# Patient Record
Sex: MALE | Race: WHITE | URBAN - METROPOLITAN AREA
[De-identification: names, ages, dates, MRNs, and addresses within clinical notes are randomized per-mention and may not be internally consistent; named-entity substitution may affect disease eponyms.]

---

## 2017-02-08 ENCOUNTER — MYC MEDICAL ADVICE (OUTPATIENT)
Dept: FAMILY MEDICINE | Facility: CLINIC | Age: 47
End: 2017-02-08

## 2017-02-08 DIAGNOSIS — J01.01 ACUTE RECURRENT MAXILLARY SINUSITIS: Primary | ICD-10-CM

## 2017-02-08 RX ORDER — AZITHROMYCIN 250 MG/1
TABLET, FILM COATED ORAL
Qty: 6 TABLET | Refills: 0 | Status: SHIPPED | OUTPATIENT
Start: 2017-02-08 | End: 2018-01-11

## 2017-03-09 DIAGNOSIS — E03.8 SUBCLINICAL HYPOTHYROIDISM: ICD-10-CM

## 2017-03-09 LAB — TSH SERPL DL<=0.05 MIU/L-ACNC: 2.02 MU/L (ref 0.4–4)

## 2017-03-09 PROCEDURE — 84443 ASSAY THYROID STIM HORMONE: CPT | Performed by: FAMILY MEDICINE

## 2017-03-09 PROCEDURE — 36415 COLL VENOUS BLD VENIPUNCTURE: CPT | Performed by: FAMILY MEDICINE

## 2017-10-12 DIAGNOSIS — F41.0 PANIC DISORDER: ICD-10-CM

## 2017-10-12 RX ORDER — SERTRALINE HYDROCHLORIDE 100 MG/1
100 TABLET, FILM COATED ORAL DAILY
Qty: 30 TABLET | Refills: 0 | Status: SHIPPED | OUTPATIENT
Start: 2017-10-12 | End: 2017-12-29

## 2017-10-12 NOTE — TELEPHONE ENCOUNTER
Sertraline     Last Written Prescription Date: 12/19/16  Last Fill Quantity: 90, # refills: 3  [place] [facility] [place]  12/02/16        Last PHQ-9 score on record=   PHQ-9 SCORE 7/26/2016   Total Score -   Total Score MyChart 2 (Minimal depression)   Total Score -

## 2017-12-29 DIAGNOSIS — F41.0 PANIC DISORDER: ICD-10-CM

## 2017-12-29 NOTE — TELEPHONE ENCOUNTER
Sertraline 100mg     Last Written Prescription Date: 10/12/2017  #30 x 0  Last filled - not provided, mail order pharmacy, [facility]  Last Office Visit with [facility] [place] care provider:  12/02/2016 CASTILLO Melton       Last PHQ-9 score on record=   PHQ-9 SCORE 7/26/2016   Total Score -   Total Score MyChart 2 (Minimal depression)   Total Score -

## 2018-01-02 NOTE — TELEPHONE ENCOUNTER
I have attempted to contact this patient by phone with the following results: left message to return my call on answering machine.  Needs a follow-up appointment for this request.    Rea Nickerson RN

## 2018-01-03 ENCOUNTER — MYC MEDICAL ADVICE (OUTPATIENT)
Dept: FAMILY MEDICINE | Facility: CLINIC | Age: 48
End: 2018-01-03

## 2018-01-04 ENCOUNTER — MYC MEDICAL ADVICE (OUTPATIENT)
Dept: FAMILY MEDICINE | Facility: CLINIC | Age: 48
End: 2018-01-04

## 2018-01-04 RX ORDER — SERTRALINE HYDROCHLORIDE 100 MG/1
100 TABLET, FILM COATED ORAL DAILY
Qty: 30 TABLET | Refills: 0 | Status: SHIPPED | OUTPATIENT
Start: 2018-01-04 | End: 2018-01-11

## 2018-01-04 ASSESSMENT — ANXIETY QUESTIONNAIRES
7. FEELING AFRAID AS IF SOMETHING AWFUL MIGHT HAPPEN: NOT AT ALL
GAD7 TOTAL SCORE: 0
4. TROUBLE RELAXING: NOT AT ALL
3. WORRYING TOO MUCH ABOUT DIFFERENT THINGS: NOT AT ALL
1. FEELING NERVOUS, ANXIOUS, OR ON EDGE: NOT AT ALL
7. FEELING AFRAID AS IF SOMETHING AWFUL MIGHT HAPPEN: NOT AT ALL
5. BEING SO RESTLESS THAT IT IS HARD TO SIT STILL: NOT AT ALL
GAD7 TOTAL SCORE: 0
6. BECOMING EASILY ANNOYED OR IRRITABLE: NOT AT ALL
2. NOT BEING ABLE TO STOP OR CONTROL WORRYING: NOT AT ALL

## 2018-01-04 NOTE — TELEPHONE ENCOUNTER
Routing refill request to provider for review/approval because:  Patient takes for panic attacks. I did send him A ARIS and a note letting him know he is overdue for an appointment. Marta Alcantara RN

## 2018-01-05 ASSESSMENT — ANXIETY QUESTIONNAIRES: GAD7 TOTAL SCORE: 0

## 2018-01-10 DIAGNOSIS — E03.8 SUBCLINICAL HYPOTHYROIDISM: ICD-10-CM

## 2018-01-10 DIAGNOSIS — F41.0 PANIC DISORDER: ICD-10-CM

## 2018-01-11 ENCOUNTER — MYC MEDICAL ADVICE (OUTPATIENT)
Dept: FAMILY MEDICINE | Facility: CLINIC | Age: 48
End: 2018-01-11

## 2018-01-11 ENCOUNTER — OFFICE VISIT (OUTPATIENT)
Dept: FAMILY MEDICINE | Facility: CLINIC | Age: 48
End: 2018-01-11
Payer: COMMERCIAL

## 2018-01-11 VITALS
TEMPERATURE: 98.2 F | OXYGEN SATURATION: 95 % | HEART RATE: 78 BPM | SYSTOLIC BLOOD PRESSURE: 136 MMHG | BODY MASS INDEX: 33.88 KG/M2 | DIASTOLIC BLOOD PRESSURE: 88 MMHG | RESPIRATION RATE: 16 BRPM | HEIGHT: 74 IN | WEIGHT: 264 LBS

## 2018-01-11 DIAGNOSIS — E03.8 SUBCLINICAL HYPOTHYROIDISM: ICD-10-CM

## 2018-01-11 DIAGNOSIS — J45.20 MILD INTERMITTENT ASTHMA WITHOUT COMPLICATION: ICD-10-CM

## 2018-01-11 DIAGNOSIS — E66.811 CLASS 1 OBESITY WITHOUT SERIOUS COMORBIDITY WITH BODY MASS INDEX (BMI) OF 33.0 TO 33.9 IN ADULT, UNSPECIFIED OBESITY TYPE: ICD-10-CM

## 2018-01-11 DIAGNOSIS — I10 ESSENTIAL HYPERTENSION: ICD-10-CM

## 2018-01-11 DIAGNOSIS — Z13.6 CARDIOVASCULAR SCREENING; LDL GOAL LESS THAN 130: Primary | ICD-10-CM

## 2018-01-11 DIAGNOSIS — R73.03 PREDIABETES: ICD-10-CM

## 2018-01-11 DIAGNOSIS — Z00.01 ENCOUNTER FOR ROUTINE ADULT MEDICAL EXAM WITH ABNORMAL FINDINGS: ICD-10-CM

## 2018-01-11 DIAGNOSIS — E03.4 HYPOTHYROIDISM DUE TO ACQUIRED ATROPHY OF THYROID: ICD-10-CM

## 2018-01-11 DIAGNOSIS — J31.0 OTHER CHRONIC RHINITIS: ICD-10-CM

## 2018-01-11 DIAGNOSIS — F41.0 PANIC DISORDER: ICD-10-CM

## 2018-01-11 DIAGNOSIS — R06.83 SNORING: ICD-10-CM

## 2018-01-11 LAB
ANION GAP SERPL CALCULATED.3IONS-SCNC: 6 MMOL/L (ref 3–14)
BUN SERPL-MCNC: 17 MG/DL (ref 7–30)
CALCIUM SERPL-MCNC: 8.7 MG/DL (ref 8.5–10.1)
CHLORIDE SERPL-SCNC: 106 MMOL/L (ref 94–109)
CHOLEST SERPL-MCNC: 195 MG/DL
CO2 SERPL-SCNC: 27 MMOL/L (ref 20–32)
CREAT SERPL-MCNC: 1.03 MG/DL (ref 0.66–1.25)
GFR SERPL CREATININE-BSD FRML MDRD: 77 ML/MIN/1.7M2
GLUCOSE SERPL-MCNC: 103 MG/DL (ref 70–99)
HBA1C MFR BLD: 5.8 % (ref 4.3–6)
HDLC SERPL-MCNC: 41 MG/DL
LDLC SERPL CALC-MCNC: 124 MG/DL
NONHDLC SERPL-MCNC: 154 MG/DL
POTASSIUM SERPL-SCNC: 4.2 MMOL/L (ref 3.4–5.3)
SODIUM SERPL-SCNC: 139 MMOL/L (ref 133–144)
TRIGL SERPL-MCNC: 148 MG/DL
TSH SERPL DL<=0.005 MIU/L-ACNC: 3.12 MU/L (ref 0.4–4)

## 2018-01-11 PROCEDURE — 80048 BASIC METABOLIC PNL TOTAL CA: CPT | Performed by: PHYSICIAN ASSISTANT

## 2018-01-11 PROCEDURE — 83036 HEMOGLOBIN GLYCOSYLATED A1C: CPT | Performed by: PHYSICIAN ASSISTANT

## 2018-01-11 PROCEDURE — 99212 OFFICE O/P EST SF 10 MIN: CPT | Mod: 25 | Performed by: PHYSICIAN ASSISTANT

## 2018-01-11 PROCEDURE — 36415 COLL VENOUS BLD VENIPUNCTURE: CPT | Performed by: PHYSICIAN ASSISTANT

## 2018-01-11 PROCEDURE — 93000 ELECTROCARDIOGRAM COMPLETE: CPT | Performed by: PHYSICIAN ASSISTANT

## 2018-01-11 PROCEDURE — 80061 LIPID PANEL: CPT | Performed by: PHYSICIAN ASSISTANT

## 2018-01-11 PROCEDURE — 84443 ASSAY THYROID STIM HORMONE: CPT | Performed by: PHYSICIAN ASSISTANT

## 2018-01-11 PROCEDURE — 99396 PREV VISIT EST AGE 40-64: CPT | Performed by: PHYSICIAN ASSISTANT

## 2018-01-11 RX ORDER — LEVOTHYROXINE SODIUM 100 UG/1
100 TABLET ORAL DAILY
Qty: 90 TABLET | Refills: 3 | Status: CANCELLED | OUTPATIENT
Start: 2018-01-11

## 2018-01-11 RX ORDER — FLUTICASONE PROPIONATE 50 MCG
1 SPRAY, SUSPENSION (ML) NASAL DAILY
Qty: 16 G | Refills: 11 | Status: SHIPPED | OUTPATIENT
Start: 2018-01-11 | End: 2018-10-02

## 2018-01-11 RX ORDER — IPRATROPIUM BROMIDE 42 UG/1
2 SPRAY, METERED NASAL 4 TIMES DAILY PRN
Qty: 1 BOX | Refills: 1 | Status: SHIPPED | OUTPATIENT
Start: 2018-01-11 | End: 2018-03-05

## 2018-01-11 RX ORDER — SERTRALINE HYDROCHLORIDE 100 MG/1
100 TABLET, FILM COATED ORAL DAILY
Qty: 90 TABLET | Refills: 3 | Status: SHIPPED | OUTPATIENT
Start: 2018-01-11 | End: 2018-12-08

## 2018-01-11 RX ORDER — PHENTERMINE HYDROCHLORIDE 15 MG/1
15 CAPSULE ORAL EVERY MORNING
Qty: 30 CAPSULE | Refills: 0 | Status: SHIPPED | OUTPATIENT
Start: 2018-01-11 | End: 2018-01-23

## 2018-01-11 RX ORDER — SERTRALINE HYDROCHLORIDE 100 MG/1
100 TABLET, FILM COATED ORAL DAILY
Qty: 30 TABLET | Refills: 0 | Status: CANCELLED | OUTPATIENT
Start: 2018-01-11

## 2018-01-11 RX ORDER — LEVOTHYROXINE SODIUM 100 UG/1
100 TABLET ORAL DAILY
Qty: 90 TABLET | Refills: 3 | Status: SHIPPED | OUTPATIENT
Start: 2018-01-11 | End: 2018-12-08

## 2018-01-11 NOTE — PROGRESS NOTES
SUBJECTIVE:   CC: Alphonso Wilburn is an 47 year old male who presents for preventative health visit.     Healthy Habits:    Do you get at least three servings of calcium containing foods daily (dairy, green leafy vegetables, etc.)? yes    Amount of exercise or daily activities, outside of work: 1 day(s) per week    Problems taking medications regularly No    Medication side effects: No    Have you had an eye exam in the past two years? no    Do you see a dentist twice per year? yes    Do you have sleep apnea, excessive snoring or daytime drowsiness?excessive snoring       Weight concerns.     Snoring and fatigue an issue. Snort awakenings.     Today's PHQ-2 Score:   PHQ-2 ( 1999 [employer]) 1/11/2018 11/29/2016   Q1: Little interest or pleasure in doing things 0 -   Q2: Feeling down, depressed or hopeless 0 -   PHQ-2 Score 0 -   Q1: Little interest or pleasure in doing things - Not at all   Q2: Feeling down, depressed or hopeless - Not at all   PHQ-2 Score - 0         Abuse: Current or Past(Physical, Sexual or Emotional)- No  Do you feel safe in your environment - Yes  Social History   Substance Use Topics     Smoking status: Never Smoker     Smokeless tobacco: Never Used     Alcohol use Yes      Comment: occasional      If you drink alcohol do you typically have >3 drinks per day or >7 drinks per week? No                      Last PSA: No results found for: PSA    Reviewed orders with patient. Reviewed health maintenance and updated orders accordingly - Yes  BP Readings from Last 3 Encounters:   01/11/18 136/88   12/02/16 135/80   10/27/15 135/89    Wt Readings from Last 3 Encounters:   01/11/18 264 lb (119.7 kg)   12/02/16 258 lb 9.6 oz (117.3 kg)   10/27/15 243 lb (110.2 kg)                      Reviewed and updated as needed this visit by clinical staffTobacco  Allergies  Meds  Problems  Med Hx  Surg Hx  Fam Hx  Soc Hx          Reviewed and updated as needed this visit by Provider  Tobacco  Allergies  Meds   "Problems  Med Hx  Surg Hx  Fam Hx  Soc Hx         Past Medical History:   Diagnosis Date     Grave's disease 3/16/2011    [facility] endocrine.   Dr. Zavala.  Radioablation 2/13.  TSH     4.28   7/24/2014 off methimazole. Possible subclinical hypothyroid.      Graves disease       History reviewed. No pertinent surgical history.    ROS:  C: NEGATIVE for fever, chills, change in weight  I: NEGATIVE for worrisome rashes, moles or lesions  E: NEGATIVE for vision changes or irritation  ENT: NEGATIVE for ear, mouth and throat problems  R: NEGATIVE for significant cough or SOB  CV: NEGATIVE for chest pain, palpitations or peripheral edema  GI: NEGATIVE for nausea, abdominal pain, heartburn, or change in bowel habits   male: negative for dysuria, hematuria, decreased urinary stream, erectile dysfunction, urethral discharge  M: NEGATIVE for significant arthralgias or myalgia  N: NEGATIVE for weakness, dizziness or paresthesias  P: NEGATIVE for changes in mood or affect    OBJECTIVE:   /88  Pulse 78  Temp 98.2  F (36.8  C) (Tympanic)  Resp 16  Ht 6' 2\" (1.88 m)  Wt 264 lb (119.7 kg)  SpO2 95%  BMI 33.9 kg/m2  EXAM:  GENERAL: healthy, alert and no distress  EYES: Eyes grossly normal to inspection, PERRL and conjunctivae and sclerae normal  HENT: ear canals and TM's normal, nose and mouth without ulcers or lesions  NECK: no adenopathy, no asymmetry, masses, or scars and thyroid normal to palpation  RESP: lungs clear to auscultation - no rales, rhonchi or wheezes  CV: regular rate and rhythm, normal S1 S2, no S3 or S4, no murmur, click or rub, no peripheral edema and peripheral pulses strong  ABDOMEN: soft, nontender, no hepatosplenomegaly, no masses and bowel sounds normal  MS: no gross musculoskeletal defects noted, no edema  SKIN: no suspicious lesions or rashes  NEURO: Normal strength and tone, mentation intact and speech normal  PSYCH: mentation appears normal, affect normal/bright  Neck " "circumference:17 inches  mallampatti : 2.5/4  ASSESSMENT/PLAN:       ICD-10-CM    1. CARDIOVASCULAR SCREENING; LDL GOAL LESS THAN 130 Z13.6 Lipid panel reflex to direct LDL Fasting   2. Prediabetes R73.03 Hemoglobin A1c   3. Moderate persistent asthma J45.40    4. Panic disorder F41.0 sertraline (ZOLOFT) 100 MG tablet   5. Subclinical hypothyroidism E03.9 levothyroxine (SYNTHROID/LEVOTHROID) 100 MCG tablet   6. Encounter for routine adult medical exam with abnormal findings Z00.01    7. Snoring R06.83 SLEEP EVALUATION & MANAGEMENT REFERRAL - ADULT -[facility] [place]  [phone] (Age 15 and up)   8. Essential hypertension I10 Basic metabolic panel  (Ca, Cl, CO2, Creat, Gluc, K, Na, BUN)   9. Other chronic rhinitis J31.0 fluticasone (FLONASE) 50 MCG/ACT spray     ipratropium (ATROVENT) 0.06 % spray   10. Hypothyroidism due to acquired atrophy of thyroid E03.4 TSH   11. Class 1 obesity without serious comorbidity with body mass index (BMI) of 33.0 to 33.9 in adult, unspecified obesity type E66.9 EKG 12-lead complete w/read - Clinics    Z68.33 phentermine 15 MG capsule       COUNSELING:  Reviewed preventive health counseling, as reflected in patient instructions       Regular exercise       Healthy diet/nutrition       reports that he has never smoked. He has never used smokeless tobacco.      Estimated body mass index is 33.9 kg/(m^2) as calculated from the following:    Height as of this encounter: 6' 2\" (1.88 m).    Weight as of this encounter: 264 lb (119.7 kg).   Weight management plan: Discussed healthy diet and exercise guidelines and patient will follow up in 12 months in clinic to re-evaluate.    Counseling Resources:  ATP IV Guidelines  Pooled Cohorts Equation Calculator  FRAX Risk Assessment  ICSI Preventive Guidelines  Dietary Guidelines for Americans, 2010  USDA's MyPlate  ASA Prophylaxis  Lung CA Screening    Homar Hare PA-C  [facility] [place]  "

## 2018-01-11 NOTE — MR AVS SNAPSHOT
After Visit Summary   1/11/2018    Alphonso Wilburn    MRN: [id number]           Patient Information     Date Of Birth          1970        Visit Information        Provider Department      1/11/2018 9:00 AM Homar Hare PA-C [facility]        Today's Diagnoses     CARDIOVASCULAR SCREENING; LDL GOAL LESS THAN 130    -  1    Prediabetes        Moderate persistent asthma        Panic disorder        Subclinical hypothyroidism        Encounter for routine adult medical exam with abnormal findings        Snoring        Essential hypertension        Other chronic rhinitis        Hypothyroidism due to acquired atrophy of thyroid        Class 1 obesity without serious comorbidity with body mass index (BMI) of 33.0 to 33.9 in adult, unspecified obesity type          Care Instructions      Preventive Health Recommendations  Male Ages 40 to 49    Yearly exam:             See your health care provider every year in order to  o   Review health changes.   o   Discuss preventive care.    o   Review your medicines if your doctor has prescribed any.    You should be tested each year for STDs (sexually transmitted diseases) if you re at risk.     Have a cholesterol test every 5 years.     Have a colonoscopy (test for colon cancer) if someone in your family has had colon cancer or polyps before age 50.     After age 45, have a diabetes test (fasting glucose). If you are at risk for diabetes, you should have this test every 3 years.      Talk with your health care provider about whether or not a prostate cancer screening test (PSA) is right for you.    Shots: Get a flu shot each year. Get a tetanus shot every 10 years.     Nutrition:    Eat at least 5 servings of fruits and vegetables daily.     Eat whole-grain bread, whole-wheat pasta and brown rice instead of white grains and rice.     Talk to your provider about Calcium and Vitamin D.     Lifestyle    Exercise for at least 150 minutes a week (30  minutes a day, 5 days a week). This will help you control your weight and prevent disease.     Limit alcohol to one drink per day.     No smoking.     Wear sunscreen to prevent skin cancer.     See your dentist every six months for an exam and cleaning.              Follow-ups after your visit        Additional Services     SLEEP EVALUATION & MANAGEMENT REFERRAL - [facility]  [phone] (Age 15 and up)       Please be aware that coverage of these services is subject to the terms and limitations of your health insurance plan.  Call member services at your health plan with any benefit or coverage questions.      Please bring the following to your appointment:    >>   List of current medications   >>   This referral request   >>   Any documents/labs given to you for this referral                      Future tests that were ordered for you today     Open Future Orders        Priority Expected Expires Ordered    SLEEP EVALUATION & MANAGEMENT REFERRAL - [facility]  [phone] (Age 15 and up) Routine  1/11/2019 1/11/2018            Who to contact     Normal or non-critical lab and imaging results will be communicated to you by Etaoshit, letter or phone within 4 business days after the clinic has received the results. If you do not hear from us within 7 days, please contact the clinic through Sensitive Object or phone. If you have a critical or abnormal lab result, we will notify you by phone as soon as possible.  Submit refill requests through Sensitive Object or call your pharmacy and they will forward the refill request to us. Please allow 3 business days for your refill to be completed.          If you need to speak with a  for additional information , please call: [phone]             Additional Information About Your Visit        Sensitive Object Information     S[employer] Object gives you secure access to your electronic health record. If you see a primary care  "provider, you can also send messages to your care team and make appointments. If you have questions, please call your primary care clinic.  If you do not have a primary care provider, please call [phone] and they will assist you.        Care EveryWhere ID     This is your Care EveryWhere ID. This could be used by other organizations to access your [facility] medical records  [id number]        Your Vitals Were     Pulse Temperature Respirations Height Pulse Oximetry BMI (Body Mass Index)    78 98.2  F (36.8  C) (Tympanic) 16 6' 2\" (1.88 m) 95% 33.9 kg/m2       Blood Pressure from Last 3 Encounters:   01/11/18 136/88   12/02/16 135/80   10/27/15 135/89    Weight from Last 3 Encounters:   01/11/18 264 lb (119.7 kg)   12/02/16 258 lb 9.6 oz (117.3 kg)   10/27/15 243 lb (110.2 kg)              We Performed the Following     Basic metabolic panel  (Ca, Cl, CO2, Creat, Gluc, K, Na, BUN)     EKG 12-lead complete w/read - Clinics     Hemoglobin A1c     Lipid panel reflex to direct LDL Fasting     TSH          Today's Medication Changes          These changes are accurate as of: 1/11/18  9:42 AM.  If you have any questions, ask your nurse or doctor.               Start taking these medicines.        Dose/Directions    ipratropium 0.06 % spray   Commonly known as:  ATROVENT   Used for:  Other chronic rhinitis   Started by:  Homar Hare PA-C        Dose:  2 spray   Spray 2 sprays into both nostrils 4 times daily as needed for rhinitis   Quantity:  1 Box   Refills:  1       phentermine 15 MG capsule   Used for:  Class 1 obesity without serious comorbidity with body mass index (BMI) of 33.0 to 33.9 in adult, unspecified obesity type   Started by:  Homar Hare PA-C        Dose:  15 mg   Take 1 capsule (15 mg) by mouth every morning   Quantity:  30 capsule   Refills:  0         Stop taking these medicines if you haven't already. Please contact your care team if you have questions.     azithromycin 250 MG tablet "   Commonly known as:  ZITHROMAX   Stopped by:  Homar Hare PA-C           flax seed oil 1000 MG capsule   Stopped by:  Homar Hare PA-C           LORazepam 1 MG tablet   Commonly known as:  ATIVAN   Stopped by:  Homar Hare PA-C           mometasone-formoterol 200-5 MCG/ACT oral inhaler   Commonly known as:  DULERA   Stopped by:  Homar Hare PA-C           propranolol 80 MG 24 hr capsule   Commonly known as:  INDERAL LA   Stopped by:  Homar Hare PA-C           typhoid CR capsule   Commonly known as:  VIVOTIF   Stopped by:  Homar Hare PA-C           vitamin D 1000 UNITS capsule   Stopped by:  Homar Hare PA-C           vitamin E 1000 UNIT capsule   Commonly known as:  TOCOPHEROL   Stopped by:  Homar Hare PA-C                Where to get your medicines      These medications were sent to [facility] [address] [facility]  [address] [address]     Phone:  [phone]     fluticasone 50 MCG/ACT spray    ipratropium 0.06 % spray    levothyroxine 100 MCG tablet    sertraline 100 MG tablet         Some of these will need a paper prescription and others can be bought over the counter.  Ask your nurse if you have questions.     Bring a paper prescription for each of these medications     phentermine 15 MG capsule                Primary Care Provider Office Phone # Fax #    Gomez Melton [phone] [address]  [place] [address]        Equal Access to Services     [facility]: Hadii aad ku hadasho Soomaali, waaxda luqadaha, qaybta kaalmada adeegyapaulina, amanda pedraza. So [facility] [phone].    ATENCIÓN: Si habla español, tiene a pollard disposición servicios gratuitos de asistencia lingüística. Roger al [phone].    We comply with applicable federal civil rights laws and [place] laws. We do not discriminate on the basis of  race, color, national origin, age, disability, sex, sexual orientation, or gender identity.            Thank you!     Thank you for choosing [facility]  for your care. Our goal is always to provide you with excellent care. Hearing back from our patients is one way we can continue to improve our services. Please take a few minutes to complete the written survey that you may receive in the mail after your visit with us. Thank you!             Your Updated Medication List - Protect others around you: Learn how to safely use, store and throw away your medicines at www.disposemymeds.org.          This list is accurate as of: 1/11/18  9:42 AM.  Always use your most recent med list.                   Brand Name Dispense Instructions for use Diagnosis    albuterol 108 (90 BASE) MCG/ACT Inhaler    PROAIR HFA    3 Inhaler    Inhale 2 puffs into the lungs every 6 hours as needed for shortness of breath / dyspnea    Moderate persistent asthma without complication       CLARITIN 10 MG capsule   Generic drug:  loratadine      as needed        Fish Oil 500 MG Caps      Take 1 capful by mouth daily.        fluticasone 50 MCG/ACT spray    FLONASE    16 g    Spray 1 spray into both nostrils daily    Other chronic rhinitis       ipratropium 0.06 % spray    ATROVENT    1 Box    Spray 2 sprays into both nostrils 4 times daily as needed for rhinitis    Other chronic rhinitis       levothyroxine 100 MCG tablet    SYNTHROID/LEVOTHROID    90 tablet    Take 1 tablet (100 mcg) by mouth daily    Subclinical hypothyroidism       MUCINEX 600 MG 12 hr tablet   Generic drug:  guaiFENesin      Take 1,200 mg by mouth as needed.        MULTI-VITAMIN PO      Take 1 tablet by mouth daily.        phentermine 15 MG capsule     30 capsule    Take 1 capsule (15 mg) by mouth every morning    Class 1 obesity without serious comorbidity with body mass index (BMI) of 33.0 to 33.9 in adult, unspecified obesity type       RANITIDINE ACID REDUCER 75  MG tablet   Generic drug:  ranitidine      once daily        sertraline 100 MG tablet    ZOLOFT    90 tablet    Take 1 tablet (100 mg) by mouth daily (Needs follow-up appointment for this medication)    Panic disorder       sildenafil 20 MG tablet    REVATIO    10 tablet    Take 1 tablet (20 mg) by mouth daily as needed For pulmonary hypertension.  Never use with nitroglycerin, terazosin or doxazosin.    Erectile dysfunction due to diseases classified elsewhere, Abnormal TSH       SUDAFED 12 HOUR PO      Take 1 tablet by mouth as needed.

## 2018-01-11 NOTE — TELEPHONE ENCOUNTER
Routing refill request to provider for review/approval because:  Patient needs to be seen because it has been more than 1 year since last office visit. Marta Alcantara RN

## 2018-01-12 ASSESSMENT — ASTHMA QUESTIONNAIRES: ACT_TOTALSCORE: 23

## 2018-01-21 ENCOUNTER — MYC MEDICAL ADVICE (OUTPATIENT)
Dept: FAMILY MEDICINE | Facility: CLINIC | Age: 48
End: 2018-01-21

## 2018-01-21 DIAGNOSIS — E66.811 CLASS 1 OBESITY WITHOUT SERIOUS COMORBIDITY WITH BODY MASS INDEX (BMI) OF 33.0 TO 33.9 IN ADULT, UNSPECIFIED OBESITY TYPE: ICD-10-CM

## 2018-01-21 DIAGNOSIS — R79.89 ABNORMAL TSH: ICD-10-CM

## 2018-01-21 DIAGNOSIS — N52.1 ERECTILE DYSFUNCTION DUE TO DISEASES CLASSIFIED ELSEWHERE: ICD-10-CM

## 2018-01-23 DIAGNOSIS — E66.811 CLASS 1 OBESITY WITHOUT SERIOUS COMORBIDITY WITH BODY MASS INDEX (BMI) OF 33.0 TO 33.9 IN ADULT, UNSPECIFIED OBESITY TYPE: ICD-10-CM

## 2018-01-23 PROBLEM — I10 ESSENTIAL HYPERTENSION: Chronic | Status: ACTIVE | Noted: 2018-01-11

## 2018-01-23 RX ORDER — PHENTERMINE HYDROCHLORIDE 30 MG/1
30 CAPSULE ORAL EVERY MORNING
Qty: 30 CAPSULE | Refills: 0 | Status: SHIPPED | OUTPATIENT
Start: 2018-01-23 | End: 2018-02-02

## 2018-01-23 RX ORDER — PHENTERMINE HYDROCHLORIDE 37.5 MG/1
37.5 CAPSULE ORAL EVERY MORNING
Qty: 30 CAPSULE | Refills: 0 | Status: SHIPPED | OUTPATIENT
Start: 2018-01-23 | End: 2018-01-23

## 2018-01-24 NOTE — TELEPHONE ENCOUNTER
Patient is requesting a 90 day supply for mail order, original Rx back to Homar's office, not faxed to [facility].

## 2018-01-25 RX ORDER — SILDENAFIL CITRATE 20 MG/1
20 TABLET ORAL DAILY PRN
Qty: 90 TABLET | Refills: 3 | Status: SHIPPED | OUTPATIENT
Start: 2018-01-25 | End: 2018-01-25

## 2018-01-25 RX ORDER — PHENTERMINE HYDROCHLORIDE 37.5 MG/1
37.5 CAPSULE ORAL EVERY MORNING
Qty: 90 CAPSULE | Refills: 0 | Status: SHIPPED | OUTPATIENT
Start: 2018-01-25 | End: 2018-02-02

## 2018-01-26 ENCOUNTER — TELEPHONE (OUTPATIENT)
Dept: FAMILY MEDICINE | Facility: CLINIC | Age: 48
End: 2018-01-26

## 2018-01-26 NOTE — TELEPHONE ENCOUNTER
Rx for 90 day phentermine faxed to [facility], message sent to patient.

## 2018-01-26 NOTE — TELEPHONE ENCOUNTER
Called [facility]  Spoke with Aureliano pharmacist.  Informed him to cancel sildenafil.  Aureliano read back instructions correctly.

## 2018-01-29 ENCOUNTER — OFFICE VISIT (OUTPATIENT)
Dept: SLEEP MEDICINE | Facility: CLINIC | Age: 48
End: 2018-01-29
Payer: COMMERCIAL

## 2018-01-29 VITALS
OXYGEN SATURATION: 96 % | SYSTOLIC BLOOD PRESSURE: 127 MMHG | HEIGHT: 74 IN | BODY MASS INDEX: 33.24 KG/M2 | HEART RATE: 71 BPM | WEIGHT: 259 LBS | DIASTOLIC BLOOD PRESSURE: 87 MMHG

## 2018-01-29 DIAGNOSIS — E66.811 CLASS 1 OBESITY DUE TO EXCESS CALORIES WITH BODY MASS INDEX (BMI) OF 33.0 TO 33.9 IN ADULT, UNSPECIFIED WHETHER SERIOUS COMORBIDITY PRESENT: ICD-10-CM

## 2018-01-29 DIAGNOSIS — R06.83 SNORING: ICD-10-CM

## 2018-01-29 DIAGNOSIS — R06.00 DYSPNEA AND RESPIRATORY ABNORMALITY: Primary | ICD-10-CM

## 2018-01-29 DIAGNOSIS — R53.83 MALAISE AND FATIGUE: ICD-10-CM

## 2018-01-29 DIAGNOSIS — Z72.820 LACK OF ADEQUATE SLEEP: ICD-10-CM

## 2018-01-29 DIAGNOSIS — R06.89 DYSPNEA AND RESPIRATORY ABNORMALITY: Primary | ICD-10-CM

## 2018-01-29 DIAGNOSIS — R53.81 MALAISE AND FATIGUE: ICD-10-CM

## 2018-01-29 DIAGNOSIS — G47.30 SLEEP APNEA, UNSPECIFIED TYPE: ICD-10-CM

## 2018-01-29 DIAGNOSIS — E66.09 CLASS 1 OBESITY DUE TO EXCESS CALORIES WITH BODY MASS INDEX (BMI) OF 33.0 TO 33.9 IN ADULT, UNSPECIFIED WHETHER SERIOUS COMORBIDITY PRESENT: ICD-10-CM

## 2018-01-29 DIAGNOSIS — I10 ESSENTIAL HYPERTENSION: ICD-10-CM

## 2018-01-29 PROBLEM — J45.20 MILD INTERMITTENT ASTHMA WITHOUT COMPLICATION: Chronic | Status: ACTIVE | Noted: 2018-01-11

## 2018-01-29 PROCEDURE — 99244 OFF/OP CNSLTJ NEW/EST MOD 40: CPT | Performed by: PHYSICIAN ASSISTANT

## 2018-01-29 NOTE — PATIENT INSTRUCTIONS

## 2018-01-29 NOTE — MR AVS SNAPSHOT
After Visit Summary   1/29/2018    Alphonso Wilburn    MRN: [id number]           Patient Information     Date Of Birth          1970        Visit Information        Provider Department      1/29/2018 11:00 AM Belén Wilkerson PA [facility]        Today's Diagnoses     Dyspnea and respiratory abnormality    -  1    Snoring        Class 1 obesity due to excess calories with body mass index (BMI) of 33.0 to 33.9 in adult, unspecified whether serious comorbidity present        Lack of adequate sleep        Essential hypertension        Sleep apnea, unspecified type        Malaise and fatigue          Care Instructions      Your BMI is Body mass index is 33.25 kg/(m^2).  Weight management is a personal decision.  If you are interested in exploring weight loss strategies, the following discussion covers the approaches that may be successful. Body mass index (BMI) is one way to tell whether you are at a healthy weight, overweight, or obese. It measures your weight in relation to your height.  A BMI of 18.5 to 24.9 is in the healthy range. A person with a BMI of 25 to 29.9 is considered overweight, and someone with a BMI of 30 or greater is considered obese. More than two-thirds of American adults are considered overweight or obese.  Being overweight or obese increases the risk for further weight gain. Excess weight may lead to heart disease and diabetes.  Creating and following plans for healthy eating and physical activity may help you improve your health.  Weight control is part of healthy lifestyle and includes exercise, emotional health, and healthy eating habits. Careful eating habits lifelong are the mainstay of weight control. Though there are significant health benefits from weight loss, long-term weight loss with diet alone may be very difficult to achieve- studies show long-term success with dietary management in less than 10% of people. Attaining a healthy weight may be especially  difficult to achieve in those with severe obesity. In some cases, medications, devices and surgical management might be considered.  What can you do?  If you are overweight or obese and are interested in methods for weight loss, you should discuss this with your provider.     Consider reducing daily calorie intake by 500 calories.     Keep a food journal.     Avoiding skipping meals, consider cutting portions instead.    Diet combined with exercise helps maintain muscle while optimizing fat loss. Strength training is particularly important for building and maintaining muscle mass. Exercise helps reduce stress, increase energy, and improves fitness. Increasing exercise without diet control, however, may not burn enough calories to loose weight.       Start walking three days a week 10-20 minutes at a time    Work towards walking thirty minutes five days a week     Eventually, increase the speed of your walking for 1-2 minutes at time    In addition, we recommend that you review healthy lifestyles and methods for weight loss available through the National Institutes of Health patient information sites:  http://win.niddk.nih.gov/publications/index.htm    And look into health and wellness programs that may be available through your health insurance provider, employer, local community center, or [employer] club.    Weight management plan: Patient was referred to their PCP to discuss a diet and exercise plan.              Follow-ups after your visit        Follow-up notes from your care team     Return in about 1 week (around 2/5/2018).      Your next 10 appointments already scheduled     Feb 07, 2018  1:30 PM CST   HST  with BK BED 5   [facility] [place]    [address] [place] [address]   [phone]            Feb 08, 2018  9:30 AM CST   HST Drop Off with CIRILO BUSTOS [facility] [place]     "[address]  [facility] [address]   [phone]            Feb 08, 2018 10:00 AM CST   Return Sleep Patient with DEBBI Figueroa   [facility] ([facility])    [address]  [facility] [address]   [phone]              Future tests that were ordered for you today     Open Future Orders        Priority Expected Expires Ordered    HST-Home Sleep Apnea Test Routine  7/31/2018 1/29/2018            Who to contact     If you have questions or need follow up information about today's clinic visit or your schedule please contact [facility] directly at [phone].  Normal or non-critical lab and imaging results will be communicated to you by Risktailhart, letter or phone within 4 business days after the clinic has received the results. If you do not hear from us within 7 days, please contact the clinic through Risk[employer] or phone. If you have a critical or abnormal lab result, we will notify you by phone as soon as possible.  Submit refill requests through [employer] or call your pharmacy and they will forward the refill request to us. Please allow 3 business days for your refill to be completed.          Additional Information About Your Visit        [employer] Information     [employer] gives you secure access to your electronic health record. If you see a primary care provider, you can also send messages to your care team and make appointments. If you have questions, please call your primary care clinic.  If you do not have a primary care provider, please call [phone] and they will assist you.        Care EveryWhere ID     This is your Care EveryWhere ID. This could be used by other organizations to access your [facility] medical records  [id number]        Your Vitals Were     Pulse Height Pulse Oximetry BMI (Body Mass Index)          71 1.88 m (6' 2\") 96% 33.25 kg/m2         Blood Pressure from Last 3 " Encounters:   01/29/18 127/87   01/11/18 136/88   12/02/16 135/80    Weight from Last 3 Encounters:   01/29/18 117.5 kg (259 lb)   01/11/18 119.7 kg (264 lb)   12/02/16 117.3 kg (258 lb 9.6 oz)              We Performed the Following     SLEEP EVALUATION & MANAGEMENT REFERRAL - ADULT -[facility]  [phone] (Age 15 and up)        Primary Care Provider Office Phone # Fax #    Homar Hare PA-C [phone]       [address]  [place] [address]        Equal Access to Services     NAHUN STARK : Hadii tayla baker hadasho Soomaali, waaxda luqadaha, qaybta kaalmada adeegyada, amanda pedraza. So [facility] [phone].    ATENCIÓN: Si habla español, tiene a pollard disposición servicios gratuitos de asistencia lingüística. Llame al [phone].    We comply with applicable federal civil rights laws and [place] laws. We do not discriminate on the basis of race, color, national origin, age, disability, sex, sexual orientation, or gender identity.            Thank you!     Thank you for choosing [facility]  for your care. Our goal is always to provide you with excellent care. Hearing back from our patients is one way we can continue to improve our services. Please take a few minutes to complete the written survey that you may receive in the mail after your visit with us. Thank you!             Your Updated Medication List - Protect others around you: Learn how to safely use, store and throw away your medicines at www.disposemymeds.org.          This list is accurate as of 1/29/18 11:21 AM.  Always use your most recent med list.                   Brand Name Dispense Instructions for use Diagnosis    albuterol 108 (90 BASE) MCG/ACT Inhaler    PROAIR HFA    3 Inhaler    Inhale 2 puffs into the lungs every 6 hours as needed for shortness of breath / dyspnea    Moderate persistent asthma without complication       CLARITIN 10 MG capsule   Generic  drug:  loratadine      as needed        Fish Oil 500 MG Caps      Take 1 capful by mouth daily.        fluticasone 50 MCG/ACT spray    FLONASE    16 g    Spray 1 spray into both nostrils daily    Other chronic rhinitis       ipratropium 0.06 % spray    ATROVENT    1 Box    Spray 2 sprays into both nostrils 4 times daily as needed for rhinitis    Other chronic rhinitis       levothyroxine 100 MCG tablet    SYNTHROID/LEVOTHROID    90 tablet    Take 1 tablet (100 mcg) by mouth daily    Subclinical hypothyroidism       MUCINEX 600 MG 12 hr tablet   Generic drug:  guaiFENesin      Take 1,200 mg by mouth as needed.        MULTI-VITAMIN PO      Take 1 tablet by mouth daily.        * phentermine 30 MG capsule     30 capsule    Take 1 capsule (30 mg) by mouth every morning    Class 1 obesity without serious comorbidity with body mass index (BMI) of 33.0 to 33.9 in adult, unspecified obesity type       * phentermine 37.5 MG capsule     90 capsule    Take 1 capsule (37.5 mg) by mouth every morning    Class 1 obesity without serious comorbidity with body mass index (BMI) of 33.0 to 33.9 in adult, unspecified obesity type       RANITIDINE ACID REDUCER 75 MG tablet   Generic drug:  ranitidine      once daily        sertraline 100 MG tablet    ZOLOFT    90 tablet    Take 1 tablet (100 mg) by mouth daily (Needs follow-up appointment for this medication)    Panic disorder       SUDAFED 12 HOUR PO      Take 1 tablet by mouth as needed.        * Notice:  This list has 2 medication(s) that are the same as other medications prescribed for you. Read the directions carefully, and ask your doctor or other care provider to review them with you.

## 2018-01-29 NOTE — NURSING NOTE
"Chief Complaint   Patient presents with     Sleep Problem     consult-My wife Seven have apnea. It wakes her up(snorting and jolt awake) I do not recall waking up.        Initial /87  Pulse 71  Ht 1.88 m (6' 2\")  Wt 117.5 kg (259 lb)  SpO2 96%  BMI 33.25 kg/m2 Estimated body mass index is 33.25 kg/(m^2) as calculated from the following:    Height as of this encounter: 1.88 m (6' 2\").    Weight as of this encounter: 117.5 kg (259 lb).  Medication Reconciliation: complete   Neck circumference: 16.25 inches/41 cm      "

## 2018-01-29 NOTE — PROGRESS NOTES
Sleep Consultation:    Date on this visit: 1/29/2018    Alphonso Wilburn is sent by Homar Hare for a sleep consultation regarding sleep disordered breathing.    Primary Physician: Homar Hare     CC: Loud snoring and snort arousals.     Alphonso goes to sleep at 8:30 PM during the week. He wakes up at 6:15 AM with an alarm. He falls asleep in 20 minutes.  Alphonso denies difficulty falling asleep.  He wakes up 1 time a night for 10 minutes before falling back to sleep.  Alphonso wakes up to go to the bathroom.  On weekends, Alphonso goes to sleep at 9:00 PM.  He wakes up at 6:30 AM without an alarm. He falls asleep in 20 minutes.  Patient gets an average of 9 hours of sleep per night. He is not refreshed.     Patient does read in bed and does not use electronics in bed and watch TV in bed.     Alphonso does not do shift work.      Alphonso does snore every night. Patient does have a regular bed partner. There is report of snoring, gasping and snorting arousals.  He does have witnessed apneas. They frequently sleep separately.  Patient sleeps on his back and side. He has frequent morning headaches(related to neck pain), denies no morning confusion and restless legs. Alphonso denies any bruxism, sleep walking, sleep talking, dream enactment, sleep paralysis, cataplexy and hypnogogic/hypnopompic hallucinations.    Alphonso denies claustrophobia and reflux at night.      Alphonso has gained 20 pounds in the last 5 years.  Patient describes themself as a morning person.  He would prefer to go to sleep at 10:00 PM and wake up at 6:00 AM.  Patient's [place] Sleepiness score 4/24 inconsistent with excessive  daytime sleepiness.  He has fatigue.     Alphonso naps 1 time per week for 45-60 minutes, feels unrefreshed after naps. He takes some inadvertant naps.  He denies falling asleep while driving. Patient was counseled on the importance of driving while alert, to pull over if drowsy, or nap before getting into the vehicle if sleepy.  He uses  2-3 cups/day of coffee. Last caffeine intake is usually before noon.    Allergies:    Allergies   Allergen Reactions     Augmentin Nausea and Vomiting     Ceftin Hives     Grass Other (See Comments)     Sneezing and eyes water      Pollen Extract Other (See Comments)     Sneezing and  eyes water        Medications:    Current Outpatient Prescriptions   Medication Sig Dispense Refill     phentermine 37.5 MG capsule Take 1 capsule (37.5 mg) by mouth every morning 90 capsule 0     phentermine 30 MG capsule Take 1 capsule (30 mg) by mouth every morning 30 capsule 0     sertraline (ZOLOFT) 100 MG tablet Take 1 tablet (100 mg) by mouth daily (Needs follow-up appointment for this medication) 90 tablet 3     levothyroxine (SYNTHROID/LEVOTHROID) 100 MCG tablet Take 1 tablet (100 mcg) by mouth daily 90 tablet 3     fluticasone (FLONASE) 50 MCG/ACT spray Spray 1 spray into both nostrils daily 16 g 11     ipratropium (ATROVENT) 0.06 % spray Spray 2 sprays into both nostrils 4 times daily as needed for rhinitis 1 Box 1     albuterol (ALBUTEROL) 108 (90 BASE) MCG/ACT inhaler Inhale 2 puffs into the lungs every 6 hours as needed for shortness of breath / dyspnea 3 Inhaler 1     guaiFENesin (MUCINEX) 600 MG 12 hr tablet Take 1,200 mg by mouth as needed.       Multiple Vitamin (MULTI-VITAMIN PO) Take 1 tablet by mouth daily.       Omega-3 Fatty Acids (FISH OIL) 500 MG CAPS Take 1 capful by mouth daily.       Pseudoephedrine HCl (SUDAFED 12 HOUR PO) Take 1 tablet by mouth as needed.       RANITIDINE ACID REDUCER 75 MG OR TABS once daily       CLARITIN 10 MG OR CAPS as needed         Problem List:  Patient Active Problem List    Diagnosis Date Noted     Essential hypertension 01/11/2018     Priority: Medium     Mild intermittent asthma without complication 01/11/2018     Priority: Medium     Erectile dysfunction due to diseases classified elsewhere 12/02/2016     Priority: Medium     Hypothyroidism - post-ablation 11/11/2014      Priority: Medium     Radioablation for Graves. TSH     2.47   10/17/2014        Obesity 07/31/2012     Priority: Medium     Palpitations 04/23/2012     Priority: Medium     No diagnosed tachy/anshul arrythmia.  Likely pac or pvc.  Beta blocker helps but might be contributing to fatigue, ashtma and weight gain.  Would like to try to taper off again in future if can manage healthier lifestyle.        Chronic fatigue 04/23/2012     Priority: Medium     April 23, 2012- Unclear etiology and significance at this point.  Consider weight, diabetes mellitus, hyperthyroid slight over-treatment, mood/stress, social, etc. Broad discussion and baseline workup. Healthy lifestyle as first step.        Prediabetes 03/16/2011     Priority: Medium     A1C      5.5   4/23/2012.  Ongoing attention.        CARDIOVASCULAR SCREENING; LDL GOAL LESS THAN 130 10/31/2010     Priority: Medium     Panic disorder 03/26/2010     Priority: Medium     Periodic use of benzo's.  No red flags.        Chronic rhinitis 10/20/2009     Priority: Medium     October 20, 2009 - Prevention discussed.  Recurrent sinusitis. Prefers zpack when criteria met. Ok for eVisit or phone if meets criteria.          GERD (gastroesophageal reflux disease) 10/20/2009     Priority: Medium     H2 over the counter.  Worse with weight gain and might be contributing to asthma.          Past Medical/Surgical History:  Past Medical History:   Diagnosis Date     Acne 3/10/2011     Grave's disease 3/16/2011    [facility] endocrine.   Dr. Zavala.  Radioablation 2/13.  TSH     4.28   7/24/2014 off methimazole. Possible subclinical hypothyroid.      Graves disease      No past surgical history on file.    Social History:  Social History     Social History     Marital status:      Spouse name: N/A     Number of children: 3     Years of education: N/A     Occupational History     Manager [employer]     Social History Main Topics     Smoking status:  Never Smoker     Smokeless tobacco: Never Used     Alcohol use Yes      Comment: occasional     Drug use: No     Sexual activity: Yes     Partners: Female     Other Topics Concern     Parent/Sibling W/ Cabg, Mi Or Angioplasty Before 65f 55m? No     Social History Narrative       Family History:  Family History   Problem Relation Age of Onset     Hypertension Mother      Depression Mother      Allergies Father      Cancer - colorectal Maternal Grandmother      DIABETES Maternal Grandfather      Hypertension Maternal Grandfather      HEART DISEASE Maternal Grandfather      CANCER Maternal Grandfather       from liver cancer at 83     Hypertension Paternal Grandfather      CANCER Paternal Grandfather      liver     Allergies Brother      Depression Brother      Allergies Daughter      Respiratory Paternal Grandmother      emphysema     Asthma Other        Review of Systems:    CONSTITUTIONAL: NEGATIVE for weight gain/loss, fever, chills, sweats or night sweats, drug allergies.  EYES: NEGATIVE for changes in vision, blind spots, double vision.  ENT: NEGATIVE for ear pain, sore throat, sinus pain, post-nasal drip, runny nose, bloody nose  CARDIAC: NEGATIVE for fast heartbeats or fluttering in chest, chest pain or pressure, breathlessness when lying flat, swollen legs or swollen feet.  NEUROLOGIC: POSITIVE for headaches. NEGATIVE, weakness or numbness in the arms or legs.  DERMATOLOGIC: NEGATIVE for rashes, new moles or change in mole(s)  PULMONARY: NEGATIVE SOB at rest, SOB with activity, dry cough, productive cough, coughing up blood, wheezing or whistling when breathing.    GASTROINTESTINAL: NEGATIVE for nausea or vomitting, loose or watery stools, fat or grease in stools, constipation, abdominal pain, bowel movements black in color or blood noted.  GENITOURINARY: NEGATIVE for pain during urination, blood in urine, urinating more frequently than usual.  MUSCULOSKELETAL: NEGATIVE for muscle pain, bone or joint pain,  "swollen joints.  ENDOCRINE: NEGATIVE for increased thirst or urination, diabetes.  LYMPHATIC: NEGATIVE for swollen lymph nodes, lumps or bumps in the breasts or nipple discharge.    Physical Examination:  Vitals: /87  Pulse 71  Ht 1.88 m (6' 2\")  Wt 117.5 kg (259 lb)  SpO2 96%  BMI 33.25 kg/m2  BMI= Body mass index is 33.25 kg/(m^2).    Neck Cir (cm): 41 cm    [place] Total Score 1/29/2018   Total score - [place] 4       GENERAL APPEARANCE: alert and no distress  EYES: Eyes grossly normal to inspection, PERRL and conjunctivae and sclerae normal  HENT: ear canals and TM's normal, nose and mouth without ulcers or lesions, oropharynx crowded and tongue base enlarged  NECK: no adenopathy and no asymmetry, masses, or scars  RESP: lungs clear to auscultation - no rales, rhonchi or wheezes  MS: extremities normal- no gross deformities noted  NEURO: Normal strength and tone, mentation intact and speech normal  PSYCH: mentation appears normal and affect normal/bright  Mallampati Class: IV.  Tonsillar Stage:  Last Basic Metabolic Panel:  Lab Results   Component Value Date     01/11/2018      Lab Results   Component Value Date    POTASSIUM 4.2 01/11/2018     Lab Results   Component Value Date    CHLORIDE 106 01/11/2018     Lab Results   Component Value Date    SHAHID 8.7 01/11/2018     Lab Results   Component Value Date    CO2 27 01/11/2018     Lab Results   Component Value Date    BUN 17 01/11/2018     Lab Results   Component Value Date    CR 1.03 01/11/2018     Lab Results   Component Value Date     01/11/2018     TSH   Date Value Ref Range Status   01/11/2018 3.12 0.40 - 4.00 mU/L Final   ]    Impression:  Patient has features and risk factors for possible obstructive sleep apnea including: loud snoring, gasping/snorting arousals, non-refreshing sleep, daytime fatigue, crowded oropharynx and co-morbid HTN. The STOP-BANG score is 4/8.     Plan:    1. Schedule a Home Sleep Apnea Testing to evaluate for " obstructive sleep apnea.    2. Advised him against drowsy driving.    3. Recommend weight management.     Literature provided regarding sleep apnea and sleep hygiene.      He will follow up with me in approximately one day after his sleep study has been completed to review the results and discuss plan of care.       Home Sleep Apnea Testing  reviewed.  Obstructive sleep apnea reviewed.  Complications of untreated sleep apnea were reviewed.    Belén Wilkerson PA-C    CC: Homar Hare

## 2018-02-02 ENCOUNTER — MYC MEDICAL ADVICE (OUTPATIENT)
Dept: FAMILY MEDICINE | Facility: CLINIC | Age: 48
End: 2018-02-02

## 2018-02-02 DIAGNOSIS — E66.811 CLASS 1 OBESITY WITHOUT SERIOUS COMORBIDITY WITH BODY MASS INDEX (BMI) OF 33.0 TO 33.9 IN ADULT, UNSPECIFIED OBESITY TYPE: ICD-10-CM

## 2018-02-02 RX ORDER — PHENTERMINE HYDROCHLORIDE 37.5 MG/1
37.5 CAPSULE ORAL EVERY MORNING
Qty: 90 CAPSULE | Refills: 0 | Status: SHIPPED | OUTPATIENT
Start: 2018-02-02 | End: 2018-10-02

## 2018-02-02 NOTE — TELEPHONE ENCOUNTER
2 scripts listed on med list, different doses, so neither pended, unsure which dosing provider wants.  Susan Orellana RN

## 2018-02-07 ENCOUNTER — OFFICE VISIT (OUTPATIENT)
Dept: SLEEP MEDICINE | Facility: CLINIC | Age: 48
End: 2018-02-07
Payer: COMMERCIAL

## 2018-02-07 DIAGNOSIS — R53.83 MALAISE AND FATIGUE: ICD-10-CM

## 2018-02-07 DIAGNOSIS — G47.30 SLEEP APNEA, UNSPECIFIED TYPE: ICD-10-CM

## 2018-02-07 DIAGNOSIS — Z72.820 LACK OF ADEQUATE SLEEP: ICD-10-CM

## 2018-02-07 DIAGNOSIS — R53.81 MALAISE AND FATIGUE: ICD-10-CM

## 2018-02-07 DIAGNOSIS — R06.89 DYSPNEA AND RESPIRATORY ABNORMALITY: ICD-10-CM

## 2018-02-07 DIAGNOSIS — R06.00 DYSPNEA AND RESPIRATORY ABNORMALITY: ICD-10-CM

## 2018-02-07 DIAGNOSIS — I10 ESSENTIAL HYPERTENSION: ICD-10-CM

## 2018-02-07 DIAGNOSIS — E66.811 CLASS 1 OBESITY DUE TO EXCESS CALORIES WITH BODY MASS INDEX (BMI) OF 33.0 TO 33.9 IN ADULT, UNSPECIFIED WHETHER SERIOUS COMORBIDITY PRESENT: ICD-10-CM

## 2018-02-07 DIAGNOSIS — E66.09 CLASS 1 OBESITY DUE TO EXCESS CALORIES WITH BODY MASS INDEX (BMI) OF 33.0 TO 33.9 IN ADULT, UNSPECIFIED WHETHER SERIOUS COMORBIDITY PRESENT: ICD-10-CM

## 2018-02-07 PROCEDURE — G0399 HOME SLEEP TEST/TYPE 3 PORTA: HCPCS | Performed by: INTERNAL MEDICINE

## 2018-02-07 NOTE — MR AVS SNAPSHOT
After Visit Summary   2/7/2018    Alphonso Wilburn    MRN: [id number]           Patient Information     Date Of Birth          1970        Visit Information        Provider Department      2/7/2018 1:30 PM BK BED [facility]        Today's Diagnoses     Class 1 obesity due to excess calories with body mass index (BMI) of 33.0 to 33.9 in adult, unspecified whether serious comorbidity present        Lack of adequate sleep        Essential hypertension        Sleep apnea, unspecified type        Dyspnea and respiratory abnormality        Malaise and fatigue           Follow-ups after your visit        Your next 10 appointments already scheduled     Feb 08, 2018  9:30 AM CST   HST Drop Off with [facility]K [facility] DME   [facility] ([facility])    [address]  [facility] [address]   [phone]            Feb 08, 2018 10:00 AM CST   Return Sleep Patient with DEBBI Figueroa   [facility] ([facility])    [address]  [facility] [id number] [phone]              Who to contact     If you have questions or need follow up information about today's clinic visit or your schedule please contact [facility] directly at [phone].  Normal or non-critical lab and imaging results will be communicated to you by MyChart, letter or phone within 4 business days after the clinic has received the results. If you do not hear from us within 7 days, please contact the clinic through MyChart or phone. If you have a critical or abnormal lab result, we will notify you by phone as soon as possible.  Submit refill requests through Boxfish or call your pharmacy and they will forward the refill request to us. Please allow 3 business days for your refill to be completed.          Additional Information About Your Visit        EcTownUSAhart Information     Boxfish gives you  secure access to your electronic health record. If you see a primary care provider, you can also send messages to your care team and make appointments. If you have questions, please call your primary care clinic.  If you do not have a primary care provider, please call [phone] and they will assist you.        Care EveryWhere ID     This is your Care EveryWhere ID. This could be used by other organizations to access your [place] medical records  [id number]         Blood Pressure from Last 3 Encounters:   01/29/18 127/87   01/11/18 136/88   12/02/16 135/80    Weight from Last 3 Encounters:   01/29/18 117.5 kg (259 lb)   01/11/18 119.7 kg (264 lb)   12/02/16 117.3 kg (258 lb 9.6 oz)              We Performed the Following     HST-Home Sleep Apnea Test        Primary Care Provider Office Phone # Fax #    Homar Hare PA-C [phone]       [address] [place] [address]        Equal Access to Services     [facility]: Hadii aad ku hadasho Soomaali, waaxda luqadaha, qaybta kaalmada adeegyada, waxay idiin hayaan tigist العراقي . So [facility] [phone].    ATENCIÓN: Si habla español, tiene a pollard disposición servicios gratuitos de asistencia lingüística. [facility] [phone].    We comply with applicable federal civil rights laws and [place] laws. We do not discriminate on the basis of race, color, national origin, age, disability, sex, sexual orientation, or gender identity.            Thank you!     Thank you for choosing [facility]  for your care. Our goal is always to provide you with excellent care. Hearing back from our patients is one way we can continue to improve our services. Please take a few minutes to complete the written survey that you may receive in the mail after your visit with us. Thank you!             Your Updated Medication List - Protect others around you: Learn how to safely use, store and throw away your medicines at www.disposemymeds.org.           This list is accurate as of 2/7/18  1:43 PM.  Always use your most recent med list.                   Brand Name Dispense Instructions for use Diagnosis    albuterol 108 (90 BASE) MCG/ACT Inhaler    PROAIR HFA    3 Inhaler    Inhale 2 puffs into the lungs every 6 hours as needed for shortness of breath / dyspnea    Moderate persistent asthma without complication       CLARITIN 10 MG capsule   Generic drug:  loratadine      as needed        Fish Oil 500 MG Caps      Take 1 capful by mouth daily.        fluticasone 50 MCG/ACT spray    FLONASE    16 g    Spray 1 spray into both nostrils daily    Other chronic rhinitis       ipratropium 0.06 % spray    ATROVENT    1 Box    Spray 2 sprays into both nostrils 4 times daily as needed for rhinitis    Other chronic rhinitis       levothyroxine 100 MCG tablet    SYNTHROID/LEVOTHROID    90 tablet    Take 1 tablet (100 mcg) by mouth daily    Subclinical hypothyroidism       MUCINEX 600 MG 12 hr tablet   Generic drug:  guaiFENesin      Take 1,200 mg by mouth as needed.        MULTI-VITAMIN PO      Take 1 tablet by mouth daily.        phentermine 37.5 MG capsule     90 capsule    Take 1 capsule (37.5 mg) by mouth every morning    Class 1 obesity without serious comorbidity with body mass index (BMI) of 33.0 to 33.9 in adult, unspecified obesity type       RANITIDINE ACID REDUCER 75 MG tablet   Generic drug:  ranitidine      once daily        sertraline 100 MG tablet    ZOLOFT    90 tablet    Take 1 tablet (100 mg) by mouth daily (Needs follow-up appointment for this medication)    Panic disorder       SUDAFED 12 HOUR PO      Take 1 tablet by mouth as needed.

## 2018-02-07 NOTE — PROGRESS NOTES
Pt is completing a home sleep test for concerns primarily related to snoring and suspected LEXIE. He was instructed on how to put on the Noxturnal T3 device and associated equipment before going to bed and given the opportunity to practice putting it on before leaving the sleep center. He was reminded to bring the home sleep test kit back to the center   tomorrow for download and reporting.

## 2018-02-08 ENCOUNTER — TELEPHONE (OUTPATIENT)
Dept: FAMILY MEDICINE | Facility: CLINIC | Age: 48
End: 2018-02-08

## 2018-02-08 ENCOUNTER — APPOINTMENT (OUTPATIENT)
Dept: SLEEP MEDICINE | Facility: CLINIC | Age: 48
End: 2018-02-08
Payer: COMMERCIAL

## 2018-02-08 NOTE — PROGRESS NOTES
This HST performed using a Noxturnal T3 device which recorded snore, sound, movement activity, body position, nasal pressure, oronasal thermal airflow, pulse, oximetry and both chest and abdominal respiratory effort. HST data was confined to the time patients states they were in bed.   Patient was score using 4% rules. Patient respiratory events showed an AHI 23.5 with loud snoring. Patient SP02 below 89% was 0.8 minutes. Overall signal quality was good.    Pt will follow up with sleep provider to determine appropriate therapy.     Ordering Rhea SHEA Abass, PA

## 2018-02-08 NOTE — TELEPHONE ENCOUNTER
Prior Authorization Retail Medication Request  Medication/Dose: phentermine 37.5 MG capsule  Diagnosis and ICD code: Class 1 obesity without serious comorbidity with body mass index (BMI) of 33.0 to 33.9 in adult, unspecified obesity type [E66.9, Z68.33]   New/Renewal/Insurance Change PA:   Previously Tried and Failed Therapies:     Insurance ID (if provided): [id number]  Insurance Phone (if provided): CoverMyMeds    Any additional info from fax request:     If you received a fax notification from an outside Pharmacy:  Pharmacy Name:[facility]  Pharmacy #:[phone]  Pharmacy Fax:[phone]

## 2018-02-08 NOTE — PROCEDURES
"HOME SLEEP STUDY INTERPRETATION    Patient: Alphonso Wilburn  MRN: [id number]  YOB: 1970  Study Date: 2018  Referring Provider: Homar Hare  Ordering Provider: DEBBI Martinez     Indications for Home Study: Alphonso Wilburn is a 47 year old male with symptoms suggestive of obstructive sleep apnea.    Estimated body mass index is 33.25 kg/(m^2) as calculated from the following:    Height as of 18: 1.88 m (6' 2\").    Weight as of 18: 117.5 kg (259 lb).  Total score - [place]: 4 (2018 10:00 AM)  STOP-BAN/8    Data: A full night home sleep study was performed recording the standard physiologic parameters including body position, movement, sound, nasal pressure, thermal oral airflow, chest and abdominal movements with respiratory inductance plethysmography, and oxygen saturation by pulse oximetry. Pulse rate was estimated by oximetry recording. This study was considered adequate based on > 4 hours of quality oximetry and respiratory recording. As specified by the AASM Manual for the Scoring of Sleep and Associated events, version 2.3, Rule VIII.D 1B, 4% oxygen desaturation scoring for hypopneas is used as a standard of care on all home sleep apnea testing.    Analysis Time:  415 minutes    Respiration:   Sleep Associated Hypoxemia: sustained hypoxemia was not present. Baseline oxygen saturation was 91%.  Time with saturation less than or equal to 88% was 0.8 minutes. The lowest oxygen saturation was 88%.   Snoring: Snoring was intermittently present.  Respiratory events: The home study revealed a presence of 43 obstructive apneas and 107 mixed and central apneas. There were 13 hypopneas resulting in a combined apnea/hypopnea index [AHI] of 23.5 events per hour.  AHI was 53.4 per hour supine, n/a per hour prone, 19.9 per hour on left side, and 6.7 per hour on right side.   Pattern: Excluding events noted above, respiratory rate and pattern was Normal.    Position: Percent of time spent: " supine - 25%, prone - 0%, on left - 38%, on right - 36%.    Heart Rate: By pulse oximetry normal rate was noted.     Assessment:   Severe sleep apnea. There appeared to be a predominance of central events, and a predominance of supine events  Sleep associated hypoxemia was not present.    Recommendations:  Consider Diagnostic Polysomnogram or Polysomnogram with all night titration  Suggest optimizing sleep hygiene and avoiding sleep deprivation.  Weight management.    Diagnosis Code(s): Obstructive Sleep Apnea G47.33, Central Sleep Apnea G47.31    Kashif Rodriguez MD, February 8, 2018   Diplomate, American Board of Internal Medicine, Sleep Medicine

## 2018-02-09 ENCOUNTER — MYC MEDICAL ADVICE (OUTPATIENT)
Dept: FAMILY MEDICINE | Facility: CLINIC | Age: 48
End: 2018-02-09

## 2018-02-09 NOTE — TELEPHONE ENCOUNTER
PA Initiation    Medication: phentermine 37.5 MG capsule - INITIATED  Insurance Company: [employer] [place] - Phone [phone] Fax [phone]  Pharmacy Filling the Rx: [facility] [place] - [address] AT [facility]  Filling Pharmacy Phone: [phone]  Filling Pharmacy Fax:    Start Date: 2/9/2081

## 2018-02-12 ENCOUNTER — VIRTUAL VISIT (OUTPATIENT)
Dept: SLEEP MEDICINE | Facility: CLINIC | Age: 48
End: 2018-02-12
Payer: COMMERCIAL

## 2018-02-12 DIAGNOSIS — R06.00 DYSPNEA AND RESPIRATORY ABNORMALITY: ICD-10-CM

## 2018-02-12 DIAGNOSIS — R06.89 DYSPNEA AND RESPIRATORY ABNORMALITY: ICD-10-CM

## 2018-02-12 DIAGNOSIS — R53.81 MALAISE AND FATIGUE: ICD-10-CM

## 2018-02-12 DIAGNOSIS — G47.30 SLEEP APNEA, UNSPECIFIED TYPE: Primary | ICD-10-CM

## 2018-02-12 DIAGNOSIS — R53.83 MALAISE AND FATIGUE: ICD-10-CM

## 2018-02-12 DIAGNOSIS — I10 ESSENTIAL HYPERTENSION: ICD-10-CM

## 2018-02-12 PROCEDURE — 98966 PH1 ASSMT&MGMT NQHP 5-10: CPT | Performed by: PHYSICIAN ASSISTANT

## 2018-02-12 NOTE — MR AVS SNAPSHOT
After Visit Summary   2/12/2018    Alphonso Wilburn    MRN: [id number]           Patient Information     Date Of Birth          1970        Visit Information        Provider Department      2/12/2018 10:20 AM Belén Wilkerson PA [facility]        Today's Diagnoses     Sleep apnea, unspecified type    -  1    Essential hypertension        Dyspnea and respiratory abnormality        Malaise and fatigue           Follow-ups after your visit        Future tests that were ordered for you today     Open Future Orders        Priority Expected Expires Ordered    Comprehensive Sleep Study Routine  8/11/2018 2/12/2018            Who to contact     If you have questions or need follow up information about today's clinic visit or your schedule please contact [facility] directly at [phone].  Normal or non-critical lab and imaging results will be communicated to you by [email or web address], letter or phone within 4 business days after the clinic has received the results. If you do not hear from us within 7 days, please contact the clinic through [email or web address] or phone. If you have a critical or abnormal lab result, we will notify you by phone as soon as possible.  Submit refill requests through [employer] or call your pharmacy and they will forward the refill request to us. Please allow 3 business days for your refill to be completed.          Additional Information About Your Visit        MyChart Information     [employer] gives you secure access to your electronic health record. If you see a primary care provider, you can also send messages to your care team and make appointments. If you have questions, please call your primary care clinic.  If you do not have a primary care provider, please call [phone] and they will assist you.        Care EveryWhere ID     This is your Care EveryWhere ID. This could be used by other organizations to access your [place] medical records  [id number]         Blood  Pressure from Last 3 Encounters:   01/29/18 127/87   01/11/18 136/88   12/02/16 135/80    Weight from Last 3 Encounters:   01/29/18 117.5 kg (259 lb)   01/11/18 119.7 kg (264 lb)   12/02/16 117.3 kg (258 lb 9.6 oz)               Primary Care Provider Office Phone # Fax #    Homar Patricia Hare PA-C [phone]       [address] [place] [address]        Equal Access to Services     [facility]: Hadii aad ku hadasho Soomaali, waaxda luqadaha, qaybta kaalmada adeegyada, waxay yolanda haymarkn tigist العراقي . So [facility] [phone].    ATENCIÓN: Si habla español, tiene a pollard disposición servicios gratuitos de asistencia lingüística. [facility] [phone].    We comply with applicable federal civil rights laws and [place] laws. We do not discriminate on the basis of race, color, national origin, age, disability, sex, sexual orientation, or gender identity.            Thank you!     Thank you for choosing [facility]  for your care. Our goal is always to provide you with excellent care. Hearing back from our patients is one way we can continue to improve our services. Please take a few minutes to complete the written survey that you may receive in the mail after your visit with us. Thank you!             Your Updated Medication List - Protect others around you: Learn how to safely use, store and throw away your medicines at www.disposemymeds.org.          This list is accurate as of 2/12/18 10:39 AM.  Always use your most recent med list.                   Brand Name Dispense Instructions for use Diagnosis    albuterol 108 (90 BASE) MCG/ACT Inhaler    PROAIR HFA    3 Inhaler    Inhale 2 puffs into the lungs every 6 hours as needed for shortness of breath / dyspnea    Moderate persistent asthma without complication       CLARITIN 10 MG capsule   Generic drug:  loratadine      as needed        Fish Oil 500 MG Caps      Take 1 capful by mouth daily.        fluticasone 50 MCG/ACT spray     FLONASE    16 g    Spray 1 spray into both nostrils daily    Other chronic rhinitis       ipratropium 0.06 % spray    ATROVENT    1 Box    Spray 2 sprays into both nostrils 4 times daily as needed for rhinitis    Other chronic rhinitis       levothyroxine 100 MCG tablet    SYNTHROID/LEVOTHROID    90 tablet    Take 1 tablet (100 mcg) by mouth daily    Subclinical hypothyroidism       MUCINEX 600 MG 12 hr tablet   Generic drug:  guaiFENesin      Take 1,200 mg by mouth as needed.        MULTI-VITAMIN PO      Take 1 tablet by mouth daily.        phentermine 37.5 MG capsule     90 capsule    Take 1 capsule (37.5 mg) by mouth every morning    Class 1 obesity without serious comorbidity with body mass index (BMI) of 33.0 to 33.9 in adult, unspecified obesity type       RANITIDINE ACID REDUCER 75 MG tablet   Generic drug:  ranitidine      once daily        sertraline 100 MG tablet    ZOLOFT    90 tablet    Take 1 tablet (100 mg) by mouth daily (Needs follow-up appointment for this medication)    Panic disorder       SUDAFED 12 HOUR PO      Take 1 tablet by mouth as needed.

## 2018-02-12 NOTE — TELEPHONE ENCOUNTER
Prior Authorization Approval    Authorization Effective Date: 2/8/2018  Authorization Expiration Date: 5/8/2018  Medication: phentermine 37.5 MG-APPROVED  Approved Dose/Quantity:    Reference #: [id number]   Insurance Company: [employer] [place] - Phone [phone] Fax [phone]  Expected CoPay: UNKNOWN     CoPay Card Available:      Foundation Assistance Needed:    Which Pharmacy is filling the prescription (Not needed for infusion/clinic administered): [facility] - [place], [place] - [address] AT [facility]  Pharmacy Notified: No  Patient Notified: Yes    PATIENT WILL CALL PHARMACY TO SET UP DELIVERY.

## 2018-02-12 NOTE — PROGRESS NOTES
"Alphonso Wilburn is a 47 year old male who is being evaluated via a telephone visit.      The patient has been notified of following:     \"This telephone visit will be conducted via a call between you and your physician/provider. We have found that certain health care needs can be provided without the need for a physical exam.  This service lets us provide the care you need with a short phone conversation.  If a prescription is necessary we can send it directly to your pharmacy.  If lab work is needed we can place an order for that and you can then stop by our lab to have the test done at a later time.    We will bill your insurance company for this service.  Please check with your medical insurance if this type of visit is covered. You may be responsible for the cost of this type of visit if insurance coverage is denied.  The typical cost is $30 (10min), $59 (11-20min) and $85 (21-30min).  Most often these visits are shorter than 10 minutes.    If during the course of the call the physician/provider feels a telephone visit is not appropriate, you will not be charged for this service.\"       Consent has been obtained for this service by care team member: yes.   See the scanned image in the medical record.    Alphonso Wilburn complains of  Study Results      I have reviewed and updated the patient's Past Medical History, Social History, Family History and Medication List.    ALLERGIES  Augmentin; Ceftin; Grass; and Pollen extract    Amalia Hare   (MA signature)    Additional provider notes  Assessment/Plan:  No diagnosis found.    I have reviewed the note as documented above.  This accurately captures the substance of my conversation with the patient,      Total time of call between patient and provider was 10 minutes         Home Sleep Apnea Testing Results Visit:    Chief Complaint   Patient presents with     Study Results       Alphonso Wilburn is a 47 year old male who was contacted on the phone after having had Home Sleep Apnea " "Testing.  He presented with  loud snoring, gasping/snorting arousals, non-refreshing sleep, daytime fatigue, crowded oropharynx and co-morbid HTN..    Estimated body mass index is 33.25 kg/(m^2) as calculated from the following:    Height as of 18: 1.88 m (6' 2\").    Weight as of 18: 117.5 kg (259 lb).  Total score - [place]: 4 (2018 10:00 AM)  STOP-BAN/8    Home Sleep Apnea Testing - 18: 259 lbs 0 oz: AHI 23.5/hr(predominatnly central). Supine AHI 53.4/hr.   Oxygen Dexter of 88%.  Baseline 91%.  Sp02 =< 88% for 0.8 minutes  He slept on his back (25.1%), prone (0.0%), left (38.4) and right (36.4%) sides.   Analysis time: 415.8 minutes.     Signal quality of Oxymeter 100% Good  Nasal Cannula 100% Good  RIP belts 100% Good.     Alphonso Wilburn reports that he slept Fair.     These findings were reviewed with patient.     Past medical/surgical history, family history, social history, medications and allergies were reviewed.      There were no vitals taken for this visit.    Assessment:   Moderate sleep apnea. There appeared to be a predominance of central events, and a predominance of supine events  Sleep associated hypoxemia was not present.     Recommendations:  Options reviewed.   Recommend split night  Polysomnogram to evaluate sleep apnea and establish optimal treatment.   The patient is instructed to follow up with me after the results of PSG are available to discuss the results and treatment options.     Belén Wilkerson PA-C    CC:  Homar Hare,          "

## 2018-02-14 NOTE — TELEPHONE ENCOUNTER
Prior Authorization Approval     Authorization Effective Date: 2/8/2018  Authorization Expiration Date: 5/8/2018  Medication: phentermine 37.5 MG-APPROVED  Approved Dose/Quantity:    Reference #: [id number]   Insurance Company: [employer] [place] - Phone [phone] Fax [phone]    Which Pharmacy is filling the prescription (Not needed for infusion/clinic administered): [facility] - [place], [place] - [address] AT [facility]  Pharmacy Notified: No  Patient Notified: Yes

## 2018-03-04 ENCOUNTER — MYC MEDICAL ADVICE (OUTPATIENT)
Dept: FAMILY MEDICINE | Facility: CLINIC | Age: 48
End: 2018-03-04

## 2018-03-04 DIAGNOSIS — J31.0 OTHER CHRONIC RHINITIS: ICD-10-CM

## 2018-03-05 RX ORDER — IPRATROPIUM BROMIDE 42 UG/1
2 SPRAY, METERED NASAL 4 TIMES DAILY PRN
Qty: 1 BOX | Refills: 1 | Status: SHIPPED | OUTPATIENT
Start: 2018-03-05 | End: 2018-04-28

## 2018-03-07 ENCOUNTER — THERAPY VISIT (OUTPATIENT)
Dept: SLEEP MEDICINE | Facility: CLINIC | Age: 48
End: 2018-03-07
Payer: COMMERCIAL

## 2018-03-07 DIAGNOSIS — G47.33 OSA (OBSTRUCTIVE SLEEP APNEA): Chronic | ICD-10-CM

## 2018-03-07 DIAGNOSIS — R06.89 DYSPNEA AND RESPIRATORY ABNORMALITY: ICD-10-CM

## 2018-03-07 DIAGNOSIS — R06.00 DYSPNEA AND RESPIRATORY ABNORMALITY: ICD-10-CM

## 2018-03-07 DIAGNOSIS — R53.83 MALAISE AND FATIGUE: ICD-10-CM

## 2018-03-07 DIAGNOSIS — I10 ESSENTIAL HYPERTENSION: ICD-10-CM

## 2018-03-07 DIAGNOSIS — R53.81 MALAISE AND FATIGUE: ICD-10-CM

## 2018-03-07 DIAGNOSIS — G47.30 SLEEP APNEA, UNSPECIFIED TYPE: ICD-10-CM

## 2018-03-07 PROCEDURE — 95810 POLYSOM 6/> YRS 4/> PARAM: CPT | Performed by: INTERNAL MEDICINE

## 2018-03-07 NOTE — MR AVS SNAPSHOT
After Visit Summary   3/7/2018    Alphonso Wilburn    MRN: [id number]           Patient Information     Date Of Birth          1970        Visit Information        Provider Department      3/7/2018 8:00 PM [facility]        Today's Diagnoses     Essential hypertension        Sleep apnea, unspecified type        Dyspnea and respiratory abnormality        Malaise and fatigue           Follow-ups after your visit        Your next 10 appointments already scheduled     Mar 22, 2018  1:00 PM CDT   Return Sleep Patient with DEBBI Figueroa   [facility] ([facility])    [address] [place] [address] [id number] [phone]              Who to contact     If you have questions or need follow up information about today's clinic visit or your schedule please contact [facility] directly at [phone].  Normal or non-critical lab and imaging results will be communicated to you by MyChart, letter or phone within 4 business days after the clinic has received the results. If you do not hear from us within 7 days, please contact the clinic through [employer] or phone. If you have a critical or abnormal lab result, we will notify you by phone as soon as possible.  Submit refill requests through [employer] or call your pharmacy and they will forward the refill request to us. Please allow 3 business days for your refill to be completed.          Additional Information About Your Visit        MyChart Information     [employer] gives you secure access to your electronic health record. If you see a primary care provider, you can also send messages to your care team and make appointments. If you have questions, please call your primary care clinic.  If you do not have a primary care provider, please call [phone] and they will assist you.        Care EveryWhere ID     This is your Care EveryWhere ID. This could be used by  other organizations to access your [place] medical records  [id number]         Blood Pressure from Last 3 Encounters:   01/29/18 127/87   01/11/18 136/88   12/02/16 135/80    Weight from Last 3 Encounters:   01/29/18 117.5 kg (259 lb)   01/11/18 119.7 kg (264 lb)   12/02/16 117.3 kg (258 lb 9.6 oz)              We Performed the Following     Comprehensive Sleep Study        Primary Care Provider Office Phone # Fax #    Homar Hare PA-C [phone]       [address]  [place] [address]        Equal Access to Services     [facility]: Hadii aad ku hadasho Soblancaali, waaxda luqadaha, qaybta kaalmada adeegyada, amanda العراقي . So [facility] [phone].    ATENCIÓN: Si habla español, tiene a pollard disposición servicios gratuitos de asistencia lingüística. Llame al [phone].    We comply with applicable federal civil rights laws and [place] laws. We do not discriminate on the basis of race, color, national origin, age, disability, sex, sexual orientation, or gender identity.            Thank you!     Thank you for choosing [facility]  for your care. Our goal is always to provide you with excellent care. Hearing back from our patients is one way we can continue to improve our services. Please take a few minutes to complete the written survey that you may receive in the mail after your visit with us. Thank you!             Your Updated Medication List - Protect others around you: Learn how to safely use, store and throw away your medicines at www.disposemymeds.org.          This list is accurate as of 3/7/18 11:59 PM.  Always use your most recent med list.                   Brand Name Dispense Instructions for use Diagnosis    albuterol 108 (90 BASE) MCG/ACT Inhaler    PROAIR HFA    3 Inhaler    Inhale 2 puffs into the lungs every 6 hours as needed for shortness of breath / dyspnea    Moderate persistent asthma without complication       CLARITIN 10 MG  capsule   Generic drug:  loratadine      as needed        Fish Oil 500 MG Caps      Take 1 capful by mouth daily.        fluticasone 50 MCG/ACT spray    FLONASE    16 g    Spray 1 spray into both nostrils daily    Other chronic rhinitis       ipratropium 0.06 % spray    ATROVENT    1 Box    Spray 2 sprays into both nostrils 4 times daily as needed for rhinitis    Other chronic rhinitis       levothyroxine 100 MCG tablet    SYNTHROID/LEVOTHROID    90 tablet    Take 1 tablet (100 mcg) by mouth daily    Subclinical hypothyroidism       MUCINEX 600 MG 12 hr tablet   Generic drug:  guaiFENesin      Take 1,200 mg by mouth as needed.        MULTI-VITAMIN PO      Take 1 tablet by mouth daily.        phentermine 37.5 MG capsule     90 capsule    Take 1 capsule (37.5 mg) by mouth every morning    Class 1 obesity without serious comorbidity with body mass index (BMI) of 33.0 to 33.9 in adult, unspecified obesity type       RANITIDINE ACID REDUCER 75 MG tablet   Generic drug:  ranitidine      once daily        sertraline 100 MG tablet    ZOLOFT    90 tablet    Take 1 tablet (100 mg) by mouth daily (Needs follow-up appointment for this medication)    Panic disorder       SUDAFED 12 HOUR PO      Take 1 tablet by mouth as needed.

## 2018-03-08 NOTE — PROGRESS NOTES
Pt arrived [facility]      Diagnostic PSG completed per provider order.  Patient did not meet criteria for PAP therapy.

## 2018-03-09 PROBLEM — G47.33 OSA (OBSTRUCTIVE SLEEP APNEA): Chronic | Status: ACTIVE | Noted: 2018-03-09

## 2018-03-09 LAB — SLPCOMP: NORMAL

## 2018-03-09 NOTE — PROCEDURES
" SLEEP STUDY INTERPRETATION  DIAGNOSTIC POLYSOMNOGRAPHY REPORT      Patient: ROSANNA HENRY  YOB: 1970  Study Date: 3/7/2018  MRN: [id number]  Referring Provider: Homar Hare  Ordering Provider: SABINA Martinez      Indications for Polysomnography: The patient is a 48 y old Male who is 6' 2\" and weighs 259.0 lbs. His BMI is 33.2, [place] sleepiness scale 4.0 and neck circumference is 41.0 cm. A diagnostic polysomnogram was performed to evaluate for moderate sleep apnea with a predominance of central events, and a predominance of supine events on HSAT.      Polysomnogram Data: A full night polysomnogram recorded the standard physiologic parameters including EEG, EOG, EMG, ECG, nasal and oral airflow. Respiratory parameters of chest and abdominal movements were recorded with respiratory inductance plethysmography. Oxygen saturation was recorded by pulse oximetry. Hypopnea scoring rule used: 1B 4%.        Sleep Architecture:   The total recording time of the polysomnogram was 483.2 minutes. The total sleep time was 375.0 minutes. Sleep latency was normal at 14.5 minutes without the use of a sleep aid. REM latency was 351.0 minutes. Arousal index was 59.5 arousals per hour. Sleep efficiency was decreased at 77.6%. Wake after sleep onset was 92.5 minutes. The patient spent 29.9% of total sleep time in Stage N1, 49.2% in Stage N2, 7.5% in Stage N3, and 13.5% in REM. Time in REM supine was 0 minutes.      Respiration:     Events ? The polysomnogram revealed a presence of 20 obstructive, 32 central (principally post-arousal), and 13 mixed apneas resulting in an apnea index of 10.4 events per hour. There were 13 obstructive hypopneas and 0 central hypopneas resulting in an obstructive hypopnea index of 2.1 and central hypopnea index of 0 events per hour. The combined apnea/hypopnea index was 12.5 events per hour (central apnea/hypopnea index was 5.1 events per hour). The REM AHI was 2.4 events per hour. The " supine AHI was 27.7 events per hour. The RERA index was 35.5 events per hour.  The RDI was 48.0 events per hour.    Snoring - was reported as moderate to loud.    Respiratory rate and pattern - was notable for normal respiratory rate and pattern.    Sustained Sleep Associated Hypoventilation - Transcutaneous carbon dioxide monitoring was not used    Sleep Associated Hypoxemia - (Greater than 5 minutes O2 sat at or below 88%) was not present. Baseline oxygen saturation was 93.4%. Lowest oxygen saturation was 84.3%. Time spent less than or equal to 88% was 0.1 minutes. Time spent less than or equal to 89% was 0.1 minutes.    Movement Activity:     Periodic Limb Activity - There were 0 PLMs during the entire study.    REM EMG Activity - Excessive transient/sustained muscle activity was not present.    Nocturnal Behavior - Abnormal sleep related behaviors were not noted    Bruxism - None apparent.      Cardiac Summary:   The average pulse rate was 63.6 bpm. The minimum pulse rate was 49.9 bpm while the maximum pulse rate was 100.1 bpm.  Arrhythmias were not noted.        Assessment:     Mild obstructive sleep apnea in the lateral position (severe by RDI)    Recommendations:    Consider repeat polysomnography with full night titration of positive airway pressure therapy for the control of sleep disordered breathing.    Alternatively, treatment could be empirically initiated with Auto?titrating PAP therapy with a range of 5 to 18 cmH2O. Recommend clinical follow up with sleep management team.    Patient may be a candidate for dental appliance through referral to Sleep Dentistry for the treatment of obstructive sleep apnea and/or socially disruptive snoring.    If devices are not acceptable or effective, patient may benefit from evaluation of possible surgical options. If he is interested, would recommend referral to specialized ENT-Sleep provider.    Advice regarding the risks of drowsy driving.    Suggest optimizing  sleep schedule and avoiding sleep deprivation.    Weight management (if BMI > 30).    Diagnostic Codes:   Obstructive Sleep Apnea G47.33     _____________________________________   Electronically Signed By: Kashif Rodriguez MD 3/9/18           Range(%) Time in range (min)   0.0 - 89.0 0.1   0.0 - 88.0 0.1         Stage Min(mm Hg) Max(mm Hg)   Wake - -   NREM(1+2+3) - -   REM - -       Range(mmHg) Time in range (min)   55.0 - 100.0 -   Excluded data <20.0 & >65.0 483.5

## 2018-03-22 ENCOUNTER — OFFICE VISIT (OUTPATIENT)
Dept: SLEEP MEDICINE | Facility: CLINIC | Age: 48
End: 2018-03-22
Payer: COMMERCIAL

## 2018-03-22 VITALS
DIASTOLIC BLOOD PRESSURE: 84 MMHG | SYSTOLIC BLOOD PRESSURE: 120 MMHG | HEIGHT: 74 IN | WEIGHT: 254 LBS | OXYGEN SATURATION: 95 % | HEART RATE: 77 BPM | BODY MASS INDEX: 32.6 KG/M2

## 2018-03-22 DIAGNOSIS — G47.33 OSA (OBSTRUCTIVE SLEEP APNEA): Primary | ICD-10-CM

## 2018-03-22 PROCEDURE — 99214 OFFICE O/P EST MOD 30 MIN: CPT | Performed by: PHYSICIAN ASSISTANT

## 2018-03-22 NOTE — MR AVS SNAPSHOT
After Visit Summary   3/22/2018    Alphonso Wilburn    MRN: [id number]           Patient Information     Date Of Birth          1970        Visit Information        Provider Department      3/22/2018 1:00 PM Belén Wilkerson PA [facility]        Today's Diagnoses     LEXIE (obstructive sleep apnea)    -  1      Care Instructions      Your BMI is Body mass index is 32.61 kg/(m^2).  Weight management is a personal decision.  If you are interested in exploring weight loss strategies, the following discussion covers the approaches that may be successful. Body mass index (BMI) is one way to tell whether you are at a healthy weight, overweight, or obese. It measures your weight in relation to your height.  A BMI of 18.5 to 24.9 is in the healthy range. A person with a BMI of 25 to 29.9 is considered overweight, and someone with a BMI of 30 or greater is considered obese. More than two-thirds of American adults are considered overweight or obese.  Being overweight or obese increases the risk for further weight gain. Excess weight may lead to heart disease and diabetes.  Creating and following plans for healthy eating and physical activity may help you improve your health.  Weight control is part of healthy lifestyle and includes exercise, emotional health, and healthy eating habits. Careful eating habits lifelong are the mainstay of weight control. Though there are significant health benefits from weight loss, long-term weight loss with diet alone may be very difficult to achieve- studies show long-term success with dietary management in less than 10% of people. Attaining a healthy weight may be especially difficult to achieve in those with severe obesity. In some cases, medications, devices and surgical management might be considered.  What can you do?  If you are overweight or obese and are interested in methods for weight loss, you should discuss this with your provider.     Consider reducing  daily calorie intake by 500 calories.     Keep a food journal.     Avoiding skipping meals, consider cutting portions instead.    Diet combined with exercise helps maintain muscle while optimizing fat loss. Strength training is particularly important for building and maintaining muscle mass. Exercise helps reduce stress, increase energy, and improves fitness. Increasing exercise without diet control, however, may not burn enough calories to loose weight.       Start walking three days a week 10-20 minutes at a time    Work towards walking thirty minutes five days a week     Eventually, increase the speed of your walking for 1-2 minutes at time    In addition, we recommend that you review healthy lifestyles and methods for weight loss available through the National Institutes of Health patient information sites:  http://win.niddk.nih.gov/publications/index.htm    And look into health and wellness programs that may be available through your health insurance provider, employer, local community center, or cresencio club.    Weight management plan: Patient was referred to their PCP to discuss a diet and exercise plan.          Your Body mass index is 32.61 kg/(m^2).  Weight management is a personal decision.  If you are interested in exploring weight loss strategies, the following discussion covers the approaches that may be successful. Body mass index (BMI) is one way to tell whether you are at a healthy weight, overweight, or obese. It measures your weight in relation to your height.  A BMI of 18.5 to 24.9 is in the healthy range. A person with a BMI of 25 to 29.9 is considered overweight, and someone with a BMI of 30 or greater is considered obese. More than two-thirds of American adults are considered overweight or obese.  Being overweight or obese increases the risk for further weight gain. Excess weight may lead to heart disease and diabetes.  Creating and following plans for healthy eating and physical activity may  help you improve your health.  Weight control is part of healthy lifestyle and includes exercise, emotional health, and healthy eating habits. Careful eating habits lifelong are the mainstay of weight control. Though there are significant health benefits from weight loss, long-term weight loss with diet alone may be very difficult to achieve- studies show long-term success with dietary management in less than 10% of people. Attaining a healthy weight may be especially difficult to achieve in those with severe obesity. In some cases, medications, devices and surgical management might be considered.  What can you do?  If you are overweight or obese and are interested in methods for weight loss, you should discuss this with your provider.     Consider reducing daily calorie intake by 500 calories.     Keep a food journal.     Avoiding skipping meals, consider cutting portions instead.    Diet combined with exercise helps maintain muscle while optimizing fat loss. Strength training is particularly important for building and maintaining muscle mass. Exercise helps reduce stress, increase energy, and improves fitness. Increasing exercise without diet control, however, may not burn enough calories to loose weight.       Start walking three days a week 10-20 minutes at a time    Work towards walking thirty minutes five days a week     Eventually, increase the speed of your walking for 1-2 minutes at time    In addition, we recommend that you review healthy lifestyles and methods for weight loss available through the National Institutes of Health patient information sites:  http://win.niddk.nih.gov/publications/index.htm    And look into health and wellness programs that may be available through your health insurance provider, employer, local community center, or cresencio club.    Weight management plan: Patient was referred to their PCP to discuss a diet and exercise plan.          Follow-ups after your visit        Additional  Services     SLEEP DENTAL REFERRAL       Dental appliance resources recommended by [facility]     Below is a list of dental appliance resources recommended by [facility]   If you wish to choose your own dental sleep dentist, you may identify a provider close to you: [email or web address]    [facility]   Sleep Medicine [facility]   Vince Fitzpatrick DDS, MS   [facility]  [address] [place] [address] [address]  [place] [address]   Appointments: [phone]  Fax: [phone]   Email: [email or web address]   Dental [facility] Clinic   Vincenzo Gomez, MARCELO Ha DDS   [address] [place]   [place] [address]   Appointments: [phone]   Website: [email or web address]    Snoring and Sleep Apnea   [facility]   DARRON Sandoval DDS  [address]   [place] [address]   Appointments: [phone]   Fax: [phone]   Email: [email or web address]  Website: [email or web address]     [facility]   Office 1   Kris Membreno DDS - [DME Medicare]  [address], [address]   [place] [address]  Appointments: [phone]  Fax: [phone]  Website: [employer]    [facility]   Office 2  Kris Membreno DDS - [DME Medicare]  [address]  [place] [address]  Appointments: [phone]  Fax: [phone]  Website: [employer]    [facility]   [facility]  Ozzy Gardiner DDS  [address] [place] [address]  Appointments: [phone]    [facility]   [place] Office   Joseph Ha DDS   [facility]   [address], [address]   [place] [address]   Appointments: [phone]   Fax: [phone]   Website: [facility]     [facility]    [place] Office   Lucien Pulido DDS, MS - [DME Medicare]  [facility]   [address], [address]   [place] [address]   Appointments: [phone]   Fax: [phone]   Website: [employer]     [facility]  Suleman Méndez, DMD, MSD - [DME Medicare]  [address]  [place] [address]  Appointments [phone]  Fax: [phone]  Website: [email or web address]    [facility]   [place] Office   Tena Downs DDS, PhD, [facility]   [address], [address]   [place] [address]   Appointments: [phone]   Fax: [phone]   Website: On[employer]     [facility]   [place] Office   Tena Downs DDS, PhD, MS   [facility]   [address], [address]   [place] [address]   Appointments: [phone]   Fax: [phone]   Website: [email or web address]     [facility]  Sharon Mandel DDS  [facility]  [address] [place] [address]  Appointments: [phone]   Fax: [phone]    If you wish to choose your own dental sleep dentist, you may identify a provider close to you: [email or web address]      AHI: 12.5 PSG DATE: 3/7/2018     Return to clinic in 3 months for Home Sleep Test to confirm adequacy of Treatment.    Other information regarding this referral: Mandibular repositioning appliance for LEXIE. If your insurance asks for a CPT code, it is .    Please be aware that coverage of these services is subject to the terms and limitations of your health insurance plan.  Call member services at your health plan with any benefit or coverage questions.      Please bring the following to your appointment:    >>   List of current medications   >>   This referral request   >>   Any documents/labs given to you for this referral                  Who to contact     If you have questions or  "need follow up information about today's clinic visit or your schedule please contact [facility] directly at [phone].  Normal or non-critical lab and imaging results will be communicated to you by MyChart, letter or phone within 4 business days after the clinic has received the results. If you do not hear from us within 7 days, please contact the clinic through W[employer] or phone. If you have a critical or abnormal lab result, we will notify you by phone as soon as possible.  Submit refill requests through S[employer]ethepie or call your pharmacy and they will forward the refill request to us. Please allow 3 business days for your refill to be completed.          Additional Information About Your Visit        [employer] Source Information     [employer] gives you secure access to your electronic health record. If you see a primary care provider, you can also send messages to your care team and make appointments. If you have questions, please call your primary care clinic.  If you do not have a primary care provider, please call [phone] and they will assist you.        Care EveryWhere ID     This is your Care EveryWhere ID. This could be used by other organizations to access your [facility] medical records  [id number]        Your Vitals Were     Pulse Height Pulse Oximetry BMI (Body Mass Index)          77 1.88 m (6' 2\") 95% 32.61 kg/m2         Blood Pressure from Last 3 Encounters:   03/22/18 120/84   01/29/18 127/87   01/11/18 136/88    Weight from Last 3 Encounters:   03/22/18 115.2 kg (254 lb)   01/29/18 117.5 kg (259 lb)   01/11/18 119.7 kg (264 lb)              We Performed the Following     SLEEP DENTAL REFERRAL        Primary Care Provider Office Phone # Fax #    Homar Hare PA-C [phone]       [address] [address]        Equal Access to Services     NAHUN STARK AH: Hadii tayla ku hadasho Soomaali, waaxda luqadaha, qaybta kaalmada adeegyada, waxay yolanda escoto " mansiradhaviktoriya chowdaryJonellemarcos ah. So [facility] [phone].    ATENCIÓN: Si dominga ricketts, tiene a pollard disposición servicios gratuitos de asistencia lingüística. Roger saab [phone].    We comply with applicable federal civil rights laws and [place] laws. We do not discriminate on the basis of race, color, national origin, age, disability, sex, sexual orientation, or gender identity.            Thank you!     Thank you for choosing [facility] SLEEP CLINIC  for your care. Our goal is always to provide you with excellent care. Hearing back from our patients is one way we can continue to improve our services. Please take a few minutes to complete the written survey that you may receive in the mail after your visit with us. Thank you!             Your Updated Medication List - Protect others around you: Learn how to safely use, store and throw away your medicines at www.disposemymeds.org.          This list is accurate as of 3/22/18  1:21 PM.  Always use your most recent med list.                   Brand Name Dispense Instructions for use Diagnosis    albuterol 108 (90 BASE) MCG/ACT Inhaler    PROAIR HFA    3 Inhaler    Inhale 2 puffs into the lungs every 6 hours as needed for shortness of breath / dyspnea    Moderate persistent asthma without complication       CLARITIN 10 MG capsule   Generic drug:  loratadine      as needed        Fish Oil 500 MG Caps      Take 1 capful by mouth daily.        fluticasone 50 MCG/ACT spray    FLONASE    16 g    Spray 1 spray into both nostrils daily    Other chronic rhinitis       ipratropium 0.06 % spray    ATROVENT    1 Box    Spray 2 sprays into both nostrils 4 times daily as needed for rhinitis    Other chronic rhinitis       levothyroxine 100 MCG tablet    SYNTHROID/LEVOTHROID    90 tablet    Take 1 tablet (100 mcg) by mouth daily    Subclinical hypothyroidism       MUCINEX 600 MG 12 hr tablet   Generic drug:  guaiFENesin      Take 1,200 mg by mouth as needed.        MULTI-VITAMIN PO      Take  1 tablet by mouth daily.        phentermine 37.5 MG capsule     90 capsule    Take 1 capsule (37.5 mg) by mouth every morning    Class 1 obesity without serious comorbidity with body mass index (BMI) of 33.0 to 33.9 in adult, unspecified obesity type       RANITIDINE ACID REDUCER 75 MG tablet   Generic drug:  ranitidine      once daily        sertraline 100 MG tablet    ZOLOFT    90 tablet    Take 1 tablet (100 mg) by mouth daily (Needs follow-up appointment for this medication)    Panic disorder       SUDAFED 12 HOUR PO      Take 1 tablet by mouth as needed.

## 2018-03-22 NOTE — PATIENT INSTRUCTIONS

## 2018-03-22 NOTE — NURSING NOTE
"Chief Complaint   Patient presents with     Results     psg results       Initial /84  Pulse 77  Ht 1.88 m (6' 2\")  Wt 115.2 kg (254 lb)  SpO2 95%  BMI 32.61 kg/m2 Estimated body mass index is 32.61 kg/(m^2) as calculated from the following:    Height as of this encounter: 1.88 m (6' 2\").    Weight as of this encounter: 115.2 kg (254 lb).  Medication Reconciliation: complete    "

## 2018-03-22 NOTE — PROGRESS NOTES
Sleep Study Follow-Up Visit:    Date on this visit: 3/22/2018    Alphonso Wilburn comes in today for follow-up of his sleep study done on 3/7/2018 at the [facility]. A diagnostic polysomnogram was performed to evaluate for moderate sleep apnea with a predominance of central events, and a predominance of supine events on HSA.     Diagnostic PSG:  Sleep Architecture:   The total recording time of the polysomnogram was 483.2 minutes. The total sleep time was 375.0 minutes. Sleep latency was normal at 14.5 minutes without the use of a sleep aid. REM latency was 351.0 minutes. Arousal index was 59.5 arousals per hour. Sleep efficiency was decreased at 77.6%. Wake after sleep onset was 92.5 minutes. The patient spent 29.9% of total sleep time in Stage N1, 49.2% in Stage N2, 7.5% in Stage N3, and 13.5% in REM. Time in REM supine was 0 minutes.        Respiration:     Events ? The polysomnogram revealed a presence of 20 obstructive, 32 central (principally post-arousal), and 13 mixed apneas resulting in an apnea index of 10.4 events per hour. There were 13 obstructive hypopneas and 0 central hypopneas resulting in an obstructive hypopnea index of 2.1 and central hypopnea index of 0 events per hour. The combined apnea/hypopnea index was 12.5 events per hour (central apnea/hypopnea index was 5.1 events per hour). The REM AHI was 2.4 events per hour. The supine AHI was 27.7 events per hour. The RERA index was 35.5 events per hour.  The RDI was 48.0 events per hour.    Snoring - was reported as moderate to loud.    Respiratory rate and pattern - was notable for normal respiratory rate and pattern.    Sustained Sleep Associated Hypoventilation - Transcutaneous carbon dioxide monitoring was not used    Sleep Associated Hypoxemia - (Greater than 5 minutes O2 sat at or below 88%) was not present. Baseline oxygen saturation was 93.4%. Lowest oxygen saturation was 84.3%. Time spent less than or equal to 88% was  0.1 minutes. Time spent less than or equal to 89% was 0.1 minutes.     Movement Activity:     Periodic Limb Activity - There were 0 PLMs during the entire study.    REM EMG Activity - Excessive transient/sustained muscle activity was not present.    Nocturnal Behavior - Abnormal sleep related behaviors were not noted    Bruxism - None apparent.    Past medical/surgical history, family history, social history, medications and allergies were reviewed.      Problem List:  Patient Active Problem List    Diagnosis Date Noted     LEXIE (obstructive sleep apnea)- 'mild' (AHI 12) 03/09/2018     Priority: Medium     3/7/2018 [facility] (259.0 lbs) - AHI 12.5, RDI 48.0, Supine AHI 27.7, REM AHI 2.4, Low O2 84.3%, Time Spent ?88% 0.1 minutes / Time Spent ?89% 0.1 minutes.       Essential hypertension 01/11/2018     Priority: Medium     Mild intermittent asthma without complication 01/11/2018     Priority: Medium     Erectile dysfunction due to diseases classified elsewhere 12/02/2016     Priority: Medium     Hypothyroidism - post-ablation 11/11/2014     Priority: Medium     Radioablation for Graves. TSH     2.47   10/17/2014        Obesity 07/31/2012     Priority: Medium     Palpitations 04/23/2012     Priority: Medium     No diagnosed tachy/anshul arrythmia.  Likely pac or pvc.  Beta blocker helps but might be contributing to fatigue, ashtma and weight gain.  Would like to try to taper off again in future if can manage healthier lifestyle.        Chronic fatigue 04/23/2012     Priority: Medium     April 23, 2012- Unclear etiology and significance at this point.  Consider weight, diabetes mellitus, hyperthyroid slight over-treatment, mood/stress, social, etc. Broad discussion and baseline workup. Healthy lifestyle as first step.        Prediabetes 03/16/2011     Priority: Medium     A1C      5.5   4/23/2012.  Ongoing attention.        CARDIOVASCULAR SCREENING; LDL GOAL LESS THAN 130 10/31/2010     Priority:  "Medium     Panic disorder 03/26/2010     Priority: Medium     Periodic use of benzo's.  No red flags.        Chronic rhinitis 10/20/2009     Priority: Medium     October 20, 2009 - Prevention discussed.  Recurrent sinusitis. Prefers zpack when criteria met. Ok for eVisit or phone if meets criteria.          GERD (gastroesophageal reflux disease) 10/20/2009     Priority: Medium     H2 over the counter.  Worse with weight gain and might be contributing to asthma.        /84  Pulse 77  Ht 1.88 m (6' 2\")  Wt 115.2 kg (254 lb)  SpO2 95%  BMI 32.61 kg/m2  Impression/Plan:    1. Mild obstructive sleep apnea in the lateral postion without sleep related hypoxemia. Severe by RDI-  Polysomnogram was reviewed in detail today with the patient and a copy given to him for his records.     Counseled the patient that mild sleep apnea is not felt to significantly increase long-term cardiovascular risk, but can contribute to excessive daytime sleepiness.    Treatment options discussed today including auto-CPAP, oral appliance therapy or polysomnography with full night PAP titration.  Elected treatment of sleep apnea with oral appliance therapy.    He will follow up with me as needed.     Twenty-five minutes spent with patient, all of which were spent face-to-face counseling, consulting, coordinating plan of care regarding LEXIE      Belén Wilkerson PA-C    CC: Homar Hare     "

## 2018-03-29 ENCOUNTER — TRANSFERRED RECORDS (OUTPATIENT)
Dept: HEALTH INFORMATION MANAGEMENT | Facility: CLINIC | Age: 48
End: 2018-03-29

## 2018-04-28 DIAGNOSIS — J31.0 OTHER CHRONIC RHINITIS: ICD-10-CM

## 2018-04-30 ENCOUNTER — TELEPHONE (OUTPATIENT)
Dept: FAMILY MEDICINE | Facility: CLINIC | Age: 48
End: 2018-04-30

## 2018-04-30 DIAGNOSIS — J31.0 OTHER CHRONIC RHINITIS: ICD-10-CM

## 2018-04-30 RX ORDER — IPRATROPIUM BROMIDE 42 UG/1
SPRAY, METERED NASAL
Qty: 15 ML | Refills: 3 | Status: SHIPPED | OUTPATIENT
Start: 2018-04-30 | End: 2018-04-30

## 2018-04-30 RX ORDER — IPRATROPIUM BROMIDE 42 UG/1
SPRAY, METERED NASAL
Qty: 1 BOX | Refills: 1 | Status: SHIPPED | OUTPATIENT
Start: 2018-04-30 | End: 2018-07-03

## 2018-04-30 NOTE — TELEPHONE ENCOUNTER
Requested Prescriptions   Pending Prescriptions Disp Refills     ipratropium (ATROVENT) 0.06 % spray [Pharmacy Med Name: IPRATROPIUM 0.06% RAFA SP 15ML (165)]  Last Written Prescription Date:  03/13/18  Last Fill Quantity: 15ml,  # refills: 0   Last office visit: 1/11/2018 with prescribing provider:  PHUONG Hare   Future Office Visit:     15 mL 0     Sig: USE 2 SPRAYS IN BOTH NOSTRILS FOUR TIMES DAILY AS NEEDED FOR RHINITIS    There is no refill protocol information for this order

## 2018-04-30 NOTE — TELEPHONE ENCOUNTER
[facility] would like clarification on the Ipratropium.  Please advise [facility] as this is a mail order pharmacy.  Ref# [id number].  Thank you

## 2018-07-03 DIAGNOSIS — J31.0 OTHER CHRONIC RHINITIS: ICD-10-CM

## 2018-07-03 RX ORDER — IPRATROPIUM BROMIDE 42 UG/1
SPRAY, METERED NASAL
Qty: 15 ML | Refills: 1 | Status: SHIPPED | OUTPATIENT
Start: 2018-07-03 | End: 2018-08-29

## 2018-07-03 NOTE — TELEPHONE ENCOUNTER
Requested Prescriptions   Pending Prescriptions Disp Refills     ipratropium (ATROVENT) 0.06 % spray [Pharmacy Med Name: IPRATROPIUM 0.06% RAFA SP 15ML (108)] 15 mL 0     Sig: USE TWO SPRAYS IN EACH NOSTRIL FOUR TIMES DAILY AS NEEDED FOR RHINITIS    There is no refill protocol information for this order      Last Written Prescription Date:  5-18-18  Last Fill Quantity: 15,  # refills: 0   Last office visit: 1/11/2018 with prescribing provider:  1-11-18   Future Office Visit:

## 2018-07-18 ENCOUNTER — TRANSFERRED RECORDS (OUTPATIENT)
Dept: HEALTH INFORMATION MANAGEMENT | Facility: CLINIC | Age: 48
End: 2018-07-18

## 2018-08-14 ENCOUNTER — OFFICE VISIT (OUTPATIENT)
Dept: FAMILY MEDICINE | Facility: CLINIC | Age: 48
End: 2018-08-14
Payer: COMMERCIAL

## 2018-08-14 VITALS
SYSTOLIC BLOOD PRESSURE: 130 MMHG | WEIGHT: 250.38 LBS | DIASTOLIC BLOOD PRESSURE: 80 MMHG | BODY MASS INDEX: 32.13 KG/M2 | TEMPERATURE: 98.9 F | HEIGHT: 74 IN | HEART RATE: 76 BPM

## 2018-08-14 DIAGNOSIS — N41.0 PROSTATITIS, ACUTE: Primary | ICD-10-CM

## 2018-08-14 PROCEDURE — 99214 OFFICE O/P EST MOD 30 MIN: CPT | Performed by: FAMILY MEDICINE

## 2018-08-14 RX ORDER — SULFAMETHOXAZOLE/TRIMETHOPRIM 800-160 MG
1 TABLET ORAL 2 TIMES DAILY
Qty: 28 TABLET | Refills: 0 | Status: SHIPPED | OUTPATIENT
Start: 2018-08-14 | End: 2018-08-28

## 2018-08-14 NOTE — PATIENT INSTRUCTIONS
*   Sounds like a prostate infection, prostatitis.  I don't have a good test for this.     *   We generally treat with antibiotics.     *   See how you do clinically.     *   Vasectomy can this more common.     *    Keep us updated.

## 2018-08-14 NOTE — MR AVS SNAPSHOT
After Visit Summary   8/14/2018    Alphonso Wilburn    MRN: [id number]           Patient Information     Date Of Birth          1970        Visit Information        Provider Department      8/14/2018 11:00 AM Erin Spain MD [facility]        Today's Diagnoses     Prostatitis, acute    -  1      Care Instructions    *   Sounds like a prostate infection, prostatitis.  I don't have a good test for this.     *   We generally treat with antibiotics.     *   See how you do clinically.     *   Vasectomy can this more common.     *    Keep us updated.           Follow-ups after your visit        Who to contact     Normal or non-critical lab and imaging results will be communicated to you by [employer], letter or phone within 4 business days after the clinic has received the results. If you do not hear from us within 7 days, please contact the clinic through [employer] or phone. If you have a critical or abnormal lab result, we will notify you by phone as soon as possible.  Submit refill requests through [employer] or call your pharmacy and they will forward the refill request to us. Please allow 3 business days for your refill to be completed.          If you need to speak with a  for additional information , please call: [phone]           Additional Information About Your Visit        [employer] Information     [employer] gives you secure access to your electronic health record. If you see a primary care provider, you can also send messages to your care team and make appointments. If you have questions, please call your primary care clinic.  If you do not have a primary care provider, please call [phone] and they will assist you.        Care EveryWhere ID     This is your Care EveryWhere ID. This could be used by other organizations to access your [place] medical records  [id number]        Your Vitals Were     Pulse Temperature Height BMI (Body Mass Index)          76  "98.9  F (37.2  C) (Tympanic) 6' 2\" (1.88 m) 32.15 kg/m2         Blood Pressure from Last 3 Encounters:   08/14/18 130/80   03/22/18 120/84   01/29/18 127/87    Weight from Last 3 Encounters:   08/14/18 250 lb 6 oz (113.6 kg)   03/22/18 254 lb (115.2 kg)   01/29/18 259 lb (117.5 kg)              Today, you had the following     No orders found for display         Today's Medication Changes          These changes are accurate as of 8/14/18 11:15 AM.  If you have any questions, ask your nurse or doctor.               Start taking these medicines.        Dose/Directions    sulfamethoxazole-trimethoprim 800-160 MG per tablet   Commonly known as:  BACTRIM DS/SEPTRA DS   Used for:  Prostatitis, acute   Started by:  Erin Spain MD        Dose:  1 tablet   Take 1 tablet by mouth 2 times daily for 14 days   Quantity:  28 tablet   Refills:  0            Where to get your medicines      These medications were sent to [facility] [address]  [address] [address]     Phone:  [phone]     sulfamethoxazole-trimethoprim 800-160 MG per tablet                Primary Care Provider Office Phone # Fax #    Homar Hare PA-C [phone] [address] [place] [address]        Equal Access to Services     [facility]: Hadii aad ku hadasho Soomaali, waaxda luqadaha, qaybta kaalmada adeegyada, waxay yaniin haymarcos العراقي ah. So [facility] [phone].    ATENCIÓN: Si habla español, tiene a pollard disposición servicios gratuitos de asistencia lingüística. Llame al [phone].    We comply with applicable federal civil rights laws and [place] laws. We do not discriminate on the basis of race, color, national origin, age, disability, sex, sexual orientation, or gender identity.            Thank you!     Thank you for choosing [facility]  for your care. Our goal is always to provide you with excellent care. Hearing " back from our patients is one way we can continue to improve our services. Please take a few minutes to complete the written survey that you may receive in the mail after your visit with us. Thank you!             Your Updated Medication List - Protect others around you: Learn how to safely use, store and throw away your medicines at www.disposemymeds.org.          This list is accurate as of 8/14/18 11:15 AM.  Always use your most recent med list.                   Brand Name Dispense Instructions for use Diagnosis    albuterol 108 (90 Base) MCG/ACT inhaler    PROAIR HFA    3 Inhaler    Inhale 2 puffs into the lungs every 6 hours as needed for shortness of breath / dyspnea    Moderate persistent asthma without complication       CLARITIN 10 MG capsule   Generic drug:  loratadine      as needed        Fish Oil 500 MG Caps      Take 1 capful by mouth daily.        fluticasone 50 MCG/ACT spray    FLONASE    16 g    Spray 1 spray into both nostrils daily    Other chronic rhinitis       ipratropium 0.06 % spray    ATROVENT    15 mL    USE TWO SPRAYS IN EACH NOSTRIL FOUR TIMES DAILY AS NEEDED FOR RHINITIS    Other chronic rhinitis       levothyroxine 100 MCG tablet    SYNTHROID/LEVOTHROID    90 tablet    Take 1 tablet (100 mcg) by mouth daily    Subclinical hypothyroidism       MUCINEX 600 MG 12 hr tablet   Generic drug:  guaiFENesin      Take 1,200 mg by mouth as needed.        MULTI-VITAMIN PO      Take 1 tablet by mouth daily.        phentermine 37.5 MG capsule     90 capsule    Take 1 capsule (37.5 mg) by mouth every morning    Class 1 obesity without serious comorbidity with body mass index (BMI) of 33.0 to 33.9 in adult, unspecified obesity type       RANITIDINE ACID REDUCER 75 MG tablet   Generic drug:  ranitidine      once daily        sertraline 100 MG tablet    ZOLOFT    90 tablet    Take 1 tablet (100 mg) by mouth daily (Needs follow-up appointment for this medication)    Panic disorder       SUDAFED 12 HOUR  PO      Take 1 tablet by mouth as needed.        sulfamethoxazole-trimethoprim 800-160 MG per tablet    BACTRIM DS/SEPTRA DS    28 tablet    Take 1 tablet by mouth 2 times daily for 14 days    Prostatitis, acute

## 2018-08-14 NOTE — PROGRESS NOTES
"  SUBJECTIVE:   Alphonso Wilburn is a 48 year old male who presents to clinic today for the following health issues:    - Possible enlarged prostate x 6 days. He states that it feels like he is sitting on a golf ball and this becomes more painful when sitting. No urinary sx. He is fatigued and run down feeling. He has had low grade fever. He denies any family or self history of prostate problems.  No PMHx UTI or hernia. He is taking Advil 600mg bid.        Problem list and histories reviewed & adjusted, as indicated.  Additional history: as documented    Past medical history: No history of diabetes.  Patient is not considered    Past surgical history: Vasectomy      Reviewed and updated as needed this visit by clinical staff  Tobacco  Allergies  Med Hx  Surg Hx  Fam Hx  Soc Hx      Reviewed and updated as needed this visit by Provider         ROS:  Constitutional, HEENT, cardiovascular, pulmonary, gi and gu systems are negative, except as otherwise noted.    OBJECTIVE:     /80  Pulse 76  Temp 98.9  F (37.2  C) (Tympanic)  Ht 6' 2\" (1.88 m)  Wt 250 lb 6 oz (113.6 kg)  BMI 32.15 kg/m2  Body mass index is 32.15 kg/(m^2).  GENERAL: Healthy, alert and no distress  EYES: Eyes grossly normal to inspection, conjunctivae and sclerae normal  RESP: Lungs clear to auscultation - no rales, rhonchi or wheezes  CV: Regular rate and rhythm, normal S1 S2, no murmur  MS: No gross musculoskeletal defects noted, no edema  NEURO: Normal strength and tone, mentation intact and speech normal  PSYCH: Mentation appears normal, affect normal/bright         ASSESSMENT/PLAN:     (N41.0) Prostatitis, acute  (primary encounter diagnosis)  Comment: History seems consistent with acute prostatitis.  Patient appears clinically stable, afebrile etiology potential treatment courses.  Try antibiotics.  Reviewed side effects.  Advised to keep in close contact with our clinic  Plan: sulfamethoxazole-trimethoprim (BACTRIM         DS/SEPTRA DS) " 800-160 MG per tablet                Patient Instructions   *   Sounds like a prostate infection, prostatitis.  I don't have a good test for this.     *   We generally treat with antibiotics.     *   See how you do clinically.     *   Vasectomy can this more common.     *    Keep us updated.            Erin Spain MD  [facility]

## 2018-08-29 DIAGNOSIS — J31.0 OTHER CHRONIC RHINITIS: ICD-10-CM

## 2018-08-29 RX ORDER — IPRATROPIUM BROMIDE 42 UG/1
2 SPRAY, METERED NASAL 4 TIMES DAILY
Qty: 15 ML | Refills: 3 | Status: SHIPPED | OUTPATIENT
Start: 2018-08-29 | End: 2018-10-01

## 2018-08-29 NOTE — TELEPHONE ENCOUNTER
Requested Prescriptions   Pending Prescriptions Disp Refills     ipratropium (ATROVENT) 0.06 % spray  Last Written Prescription Date:  07/03/18  Last Fill Quantity: 15,  # refills: 1   Last office visit: 8/14/2018 with prescribing provider: vale Spain, 01/11/18  PHUONG Hare   Future Office Visit:     15 mL 1    There is no refill protocol information for this order

## 2018-09-24 ENCOUNTER — DOCUMENTATION ONLY (OUTPATIENT)
Dept: LAB | Facility: CLINIC | Age: 48
End: 2018-09-24

## 2018-09-24 DIAGNOSIS — E03.9 HYPOTHYROIDISM: Chronic | ICD-10-CM

## 2018-09-24 DIAGNOSIS — R73.03 PREDIABETES: ICD-10-CM

## 2018-09-24 DIAGNOSIS — I10 ESSENTIAL HYPERTENSION: Primary | Chronic | ICD-10-CM

## 2018-09-24 LAB — HBA1C MFR BLD: 5.5 % (ref 0–5.6)

## 2018-09-24 PROCEDURE — 80048 BASIC METABOLIC PNL TOTAL CA: CPT | Performed by: PHYSICIAN ASSISTANT

## 2018-09-24 PROCEDURE — 84443 ASSAY THYROID STIM HORMONE: CPT | Performed by: PHYSICIAN ASSISTANT

## 2018-09-24 PROCEDURE — 83036 HEMOGLOBIN GLYCOSYLATED A1C: CPT | Performed by: PHYSICIAN ASSISTANT

## 2018-09-24 PROCEDURE — 36415 COLL VENOUS BLD VENIPUNCTURE: CPT | Performed by: PHYSICIAN ASSISTANT

## 2018-09-24 NOTE — PROGRESS NOTES
Patient is scheduled for a Lab appointment TODAY for TSH. His note states 'etc,' but he doesn't seem to be due for anything else.  Please sign pending orders and add any other tests needed. If the labs are not needed, please follow up with the patient.    Thank you,   Karina De La Fuente/Gio)

## 2018-09-24 NOTE — PROGRESS NOTES
Patient was drawn for JIC testing. Please place future orders (or order from the lab encounter) if needed.  Thanks,  Karina

## 2018-09-25 LAB
ANION GAP SERPL CALCULATED.3IONS-SCNC: 8 MMOL/L (ref 3–14)
BUN SERPL-MCNC: 14 MG/DL (ref 7–30)
CALCIUM SERPL-MCNC: 8.8 MG/DL (ref 8.5–10.1)
CHLORIDE SERPL-SCNC: 105 MMOL/L (ref 94–109)
CO2 SERPL-SCNC: 25 MMOL/L (ref 20–32)
CREAT SERPL-MCNC: 1.13 MG/DL (ref 0.66–1.25)
GFR SERPL CREATININE-BSD FRML MDRD: 69 ML/MIN/1.7M2
GLUCOSE SERPL-MCNC: 79 MG/DL (ref 70–99)
POTASSIUM SERPL-SCNC: 4.1 MMOL/L (ref 3.4–5.3)
SODIUM SERPL-SCNC: 138 MMOL/L (ref 133–144)
TSH SERPL DL<=0.005 MIU/L-ACNC: 1.45 MU/L (ref 0.4–4)

## 2018-09-27 ENCOUNTER — MYC MEDICAL ADVICE (OUTPATIENT)
Dept: FAMILY MEDICINE | Facility: CLINIC | Age: 48
End: 2018-09-27

## 2018-10-01 DIAGNOSIS — J31.0 OTHER CHRONIC RHINITIS: ICD-10-CM

## 2018-10-01 NOTE — TELEPHONE ENCOUNTER
Routing refill request to provider for review/approval because:  Drug not on the [facility] refill protocol     An Reyes RN, BSN, PHN

## 2018-10-01 NOTE — TELEPHONE ENCOUNTER
Requested Prescriptions   Pending Prescriptions Disp Refills     ipratropium (ATROVENT) 0.06 % spray [Pharmacy Med Name: IPRATROPIUM 0.06% RAFA SP 15ML (165)]  Last Written Prescription Date:  8/29/18  Last Fill Quantity: 15 mL,  # refills: 3   Last office visit: 8/14/2018 with prescribing provider:  Judit   Future Office Visit:   Next 5 appointments (look out 90 days)     Oct 02, 2018 11:20 AM CDT   Izabella Andrew with Homar Hare PA-C   [facility]    [address] [address]   [phone]                  60 mL 0     Sig: USE 2 SPRAYS IN EACH NOSTRIL FOUR TIMES DAILY    There is no refill protocol information for this order

## 2018-10-02 ENCOUNTER — OFFICE VISIT (OUTPATIENT)
Dept: FAMILY MEDICINE | Facility: CLINIC | Age: 48
End: 2018-10-02
Payer: COMMERCIAL

## 2018-10-02 VITALS
WEIGHT: 252 LBS | SYSTOLIC BLOOD PRESSURE: 126 MMHG | HEART RATE: 75 BPM | BODY MASS INDEX: 32.34 KG/M2 | DIASTOLIC BLOOD PRESSURE: 87 MMHG | HEIGHT: 74 IN | RESPIRATION RATE: 16 BRPM | OXYGEN SATURATION: 99 %

## 2018-10-02 DIAGNOSIS — E66.811 CLASS 1 OBESITY WITHOUT SERIOUS COMORBIDITY WITH BODY MASS INDEX (BMI) OF 32.0 TO 32.9 IN ADULT, UNSPECIFIED OBESITY TYPE: Primary | Chronic | ICD-10-CM

## 2018-10-02 DIAGNOSIS — J30.1 NON-SEASONAL ALLERGIC RHINITIS DUE TO POLLEN: ICD-10-CM

## 2018-10-02 DIAGNOSIS — J45.20 MILD INTERMITTENT ASTHMA WITHOUT COMPLICATION: Chronic | ICD-10-CM

## 2018-10-02 DIAGNOSIS — Z23 IMMUNIZATION DUE: ICD-10-CM

## 2018-10-02 DIAGNOSIS — F43.23 ADJUSTMENT DISORDER WITH MIXED ANXIETY AND DEPRESSED MOOD: ICD-10-CM

## 2018-10-02 DIAGNOSIS — E03.4 HYPOTHYROIDISM DUE TO ACQUIRED ATROPHY OF THYROID: Chronic | ICD-10-CM

## 2018-10-02 PROBLEM — I10 ESSENTIAL HYPERTENSION: Chronic | Status: RESOLVED | Noted: 2018-01-11 | Resolved: 2018-10-02

## 2018-10-02 PROCEDURE — 99214 OFFICE O/P EST MOD 30 MIN: CPT | Mod: 25 | Performed by: PHYSICIAN ASSISTANT

## 2018-10-02 PROCEDURE — 90471 IMMUNIZATION ADMIN: CPT | Performed by: PHYSICIAN ASSISTANT

## 2018-10-02 PROCEDURE — 90686 IIV4 VACC NO PRSV 0.5 ML IM: CPT | Performed by: PHYSICIAN ASSISTANT

## 2018-10-02 RX ORDER — IPRATROPIUM BROMIDE 42 UG/1
SPRAY, METERED NASAL
Qty: 60 ML | Refills: 0 | Status: SHIPPED | OUTPATIENT
Start: 2018-10-02 | End: 2018-11-03

## 2018-10-02 RX ORDER — FEXOFENADINE HCL AND PSEUDOEPHEDRINE HCI 60; 120 MG/1; MG/1
1 TABLET, EXTENDED RELEASE ORAL 2 TIMES DAILY
Qty: 90 TABLET | Refills: 0 | Status: SHIPPED | OUTPATIENT
Start: 2018-10-02

## 2018-10-02 NOTE — LETTER
My Depression Action Plan  Name: Alphonso Wilburn   Date of Birth 1970  Date: 10/2/2018    My doctor: Homar Hare   My clinic: [place] [id number] [facility]  [place] [id number] [phone]          GREEN    ZONE   Good Control    What it looks like:     Things are going generally well. You have normal up s and down s. You may even feel depressed from time to time, but bad moods usually last less than a day.   What you need to do:  1. Continue to care for yourself (see self care plan)  2. Check your depression survival kit and update it as needed  3. Follow your physician s recommendations including any medication.  4. Do not stop taking medication unless you consult with your physician first.           YELLOW         ZONE Getting Worse    What it looks like:     Depression is starting to interfere with your life.     It may be hard to get out of bed; you may be starting to isolate yourself from others.    Symptoms of depression are starting to last most all day and this has happened for several days.     You may have suicidal thoughts but they are not constant.   What you need to do:     1. Call your care team, your response to treatment will improve if you keep your care team informed of your progress. Yellow periods are signs an adjustment may need to be made.     2. Continue your self-care, even if you have to fake it!    3. Talk to someone in your support network    4. Open up your depression survival kit           RED    ZONE Medical Alert - Get Help    What it looks like:     Depression is seriously interfering with your life.     You may experience these or other symptoms: You can t get out of bed most days, can t work or engage in other necessary activities, you have trouble taking care of basic hygiene, or basic responsibilities, thoughts of suicide or death that will not go away, self-injurious behavior.     What you need to do:  1. Call your care team and  request a same-day appointment. If they are not available (weekends or after hours) call your local crisis line, emergency room or 911.            Depression Self Care Plan / Survival Kit    Self-Care for Depression  Here s the deal. Your body and mind are really not as separate as most people think.  What you do and think affects how you feel and how you feel influences what you do and think. This means if you do things that people who feel good do, it will help you feel better.  Sometimes this is all it takes.  There is also a place for medication and therapy depending on how severe your depression is, so be sure to consult with your medical provider and/ or Behavioral Health Consultant if your symptoms are worsening or not improving.     In order to better manage my stress, I will:    Exercise  Get some form of exercise, every day. This will help reduce pain and release endorphins, the  feel good  chemicals in your brain. This is almost as good as taking antidepressants!  This is not the same as joining a gym and then never going! (they count on that by the way ) It can be as simple as just going for a walk or doing some gardening, anything that will get you moving.      Hygiene   Maintain good hygiene (Get out of bed in the morning, Make your bed, Brush your teeth, Take a shower, and Get dressed like you were going to work, even if you are unemployed).  If your clothes don't fit try to get ones that do.    Diet  I will strive to eat foods that are good for me, drink plenty of water, and avoid excessive sugar, caffeine, alcohol, and other mood-altering substances.  Some foods that are helpful in depression are: complex carbohydrates, B vitamins, flaxseed, fish or fish oil, fresh fruits and vegetables.    Psychotherapy  I agree to participate in Individual Therapy (if recommended).    Medication  If prescribed medications, I agree to take them.  Missing doses can result in serious side effects.  I understand that  drinking alcohol, or other illicit drug use, may cause potential side effects.  I will not stop my medication abruptly without first discussing it with my provider.    Staying Connected With Others  I will stay in touch with my friends, family members, and my primary care provider/team.    Use your imagination  Be creative.  We all have a creative side; it doesn t matter if it s oil painting, sand castles, or mud pies! This will also kick up the endorphins.    Witness Beauty  (AKA stop and smell the roses) Take a look outside, even in mid-winter. Notice colors, textures. Watch the squirrels and birds.     Service to others  Be of service to others.  There is always someone else in need.  By helping others we can  get out of ourselves  and remember the really important things.  This also provides opportunities for practicing all the other parts of the program.    Humor  Laugh and be silly!  Adjust your TV habits for less news and crime-drama and more comedy.    Control your stress  Try breathing deep, massage therapy, biofeedback, and meditation. Find time to relax each day.     My support system    Clinic Contact:  Phone number:    Contact 1:  Phone number:    Contact 2:  Phone number:    Restorationist/:  Phone number:    Therapist:  Phone number:    Local crisis center:    Phone number:    Other community support:  Phone number:

## 2018-10-02 NOTE — PROGRESS NOTES
SUBJECTIVE:   Alphonso Wilburn is a 48 year old male who presents to clinic today for the following health issues:      Hypertension Follow-up      Outpatient blood pressures are not being checked.    Low Salt Diet: not monitoring salt    Asthma Follow-Up    Was ACT completed today?    Yes    ACT Total Scores 10/2/2018   ACT TOTAL SCORE -   ASTHMA ER VISITS -   ASTHMA HOSPITALIZATIONS -   ACT TOTAL SCORE (Goal Greater than or Equal to 20) 24   In the past 12 months, how many times did you visit the emergency room for your asthma without being admitted to the hospital? 0   In the past 12 months, how many times were you hospitalized overnight because of your asthma? 0       Recent asthma triggers that patient is dealing with: None        Amount of exercise or physical activity: 2 days weekly goes for short walks    Problems taking medications regularly: No    Medication side effects: none    Diet: regular (no restrictions)      Depression/anxiety recheck: stable.   Denies profound anhedonia.   Some fatigue at times. Some lack in motivation.   No joint pains or rashes or fevers.       Problem list and histories reviewed & adjusted, as indicated.  Additional history: as documented    BP Readings from Last 3 Encounters:   10/02/18 126/87   08/14/18 130/80   03/22/18 120/84    Wt Readings from Last 3 Encounters:   10/02/18 252 lb (114.3 kg)   08/14/18 250 lb 6 oz (113.6 kg)   03/22/18 254 lb (115.2 kg)                    Reviewed and updated as needed this visit by clinical staff  Tobacco  Allergies  Meds  Problems  Med Hx  Surg Hx  Fam Hx  Soc Hx        Reviewed and updated as needed this visit by Provider  Tobacco  Allergies  Meds  Problems  Med Hx  Surg Hx  Fam Hx  Soc Hx          All other systems negative except as outline above  OBJECTIVE:  Eye exam - right eye normal lid, conjunctiva, cornea, pupil and fundus, left eye normal lid, conjunctiva, cornea, pupil and fundus.  Thyroid not palpable, not enlarged,  no nodules detected.  CHEST:chest clear to IPPA, no tachypnea, retractions or cyanosis and S1, S2 normal, no murmur, no gallop, rate regular.  Foot exam - both sides normal; no swelling, tenderness or skin or vascular lesions. Color and temperature is normal. Sensation is intact. Peripheral pulses are palpable. Toenails are normal.    Alphonso was seen today for hypertension and asthma.    Diagnoses and all orders for this visit:    Class 1 obesity without serious comorbidity with body mass index (BMI) of 32.0 to 32.9 in adult, unspecified obesity type    Immunization due  -     FLU VAC PRESRV FREE QUAD SPLIT VIR, IM (3+ YRS)  -     ADMIN 1st VACCINE    Mild intermittent asthma without complication    Hypothyroidism due to acquired atrophy of thyroid    Adjustment disorder with mixed anxiety and depressed mood    Non-seasonal allergic rhinitis due to pollen  -     fexofenadine-pseudoePHEDrine (ALLEGRA-D)  MG per 12 hr tablet; Take 1 tablet by mouth 2 times daily    Other orders  -     DEPRESSION ACTION PLAN (DAP)      Advised supportive and symptomatic treatment.  Follow up with Provider - if condition persists or worsens.   work on lifestyle modification

## 2018-10-02 NOTE — MR AVS SNAPSHOT
After Visit Summary   10/2/2018    Alphonso Wilburn    MRN: [id number]           Patient Information     Date Of Birth          1970        Visit Information        Provider Department      10/2/2018 11:20 AM Homar Hare PA-C [facility]        Today's Diagnoses     Class 1 obesity without serious comorbidity with body mass index (BMI) of 32.0 to 32.9 in adult, unspecified obesity type    -  1    Immunization due        Mild intermittent asthma without complication        Hypothyroidism due to acquired atrophy of thyroid        Adjustment disorder with mixed anxiety and depressed mood        Non-seasonal allergic rhinitis due to pollen           Follow-ups after your visit        Who to contact     Normal or non-critical lab and imaging results will be communicated to you by [employer], letter or phone within 4 business days after the clinic has received the results. If you do not hear from us within 7 days, please contact the clinic through [employer] or phone. If you have a critical or abnormal lab result, we will notify you by phone as soon as possible.  Submit refill requests through [employer] or call your pharmacy and they will forward the refill request to us. Please allow 3 business days for your refill to be completed.          If you need to speak with a  for additional information , please call: [phone]             Additional Information About Your Visit        [employer] Information     [employer] gives you secure access to your electronic health record. If you see a primary care provider, you can also send messages to your care team and make appointments. If you have questions, please call your primary care clinic.  If you do not have a primary care provider, please call [phone] and they will assist you.        Care EveryWhere ID     This is your Care EveryWhere ID. This could be used by other organizations to access your [facility]  "records  [id number]        Your Vitals Were     Pulse Respirations Height Pulse Oximetry BMI (Body Mass Index)       75 16 6' 2\" (1.88 m) 99% 32.35 kg/m2        Blood Pressure from Last 3 Encounters:   10/02/18 126/87   08/14/18 130/80   03/22/18 120/84    Weight from Last 3 Encounters:   10/02/18 252 lb (114.3 kg)   08/14/18 250 lb 6 oz (113.6 kg)   03/22/18 254 lb (115.2 kg)              We Performed the Following     ADMIN 1st VACCINE     FLU VAC PRESRV FREE QUAD SPLIT VIR, IM (3+ YRS)          Today's Medication Changes          These changes are accurate as of 10/2/18 12:06 PM.  If you have any questions, ask your nurse or doctor.               Start taking these medicines.        Dose/Directions    fexofenadine-pseudoePHEDrine  MG per 12 hr tablet   Commonly known as:  ALLEGRA-D   Used for:  Non-seasonal allergic rhinitis due to pollen   Started by:  Homar Hare PA-C        Dose:  1 tablet   Take 1 tablet by mouth 2 times daily   Quantity:  90 tablet   Refills:  0         Stop taking these medicines if you haven't already. Please contact your care team if you have questions.     fluticasone 50 MCG/ACT spray   Commonly known as:  FLONASE   Stopped by:  Homar Hare PA-C           phentermine 37.5 MG capsule   Stopped by:  Homar Hare PA-C                Where to get your medicines      Some of these will need a paper prescription and others can be bought over the counter.  Ask your nurse if you have questions.     Bring a paper prescription for each of these medications     fexofenadine-pseudoePHEDrine  MG per 12 hr tablet                Primary Care Provider Office Phone # Fax #    Homar Hare PA-C [phone]       [address] [address]        Equal Access to Services     [facility] LINCOLN : Cody brooks Soatilio, waaxda luqadaha, qaybta kaalmada tigistyapaulina, amanda العراقي . [facility] [phone].    ATENCIÓN: Si " dominga ricketts, tiene a pollard disposición servicios gratuitos de asistencia lingüística. Roger saab [phone].    We comply with applicable federal civil rights laws and [place] laws. We do not discriminate on the basis of race, color, national origin, age, disability, sex, sexual orientation, or gender identity.            Thank you!     Thank you for choosing [facility]  for your care. Our goal is always to provide you with excellent care. Hearing back from our patients is one way we can continue to improve our services. Please take a few minutes to complete the written survey that you may receive in the mail after your visit with us. Thank you!             Your Updated Medication List - Protect others around you: Learn how to safely use, store and throw away your medicines at www.disposemymeds.org.          This list is accurate as of 10/2/18 12:06 PM.  Always use your most recent med list.                   Brand Name Dispense Instructions for use Diagnosis    albuterol 108 (90 Base) MCG/ACT inhaler    PROAIR HFA    3 Inhaler    Inhale 2 puffs into the lungs every 6 hours as needed for shortness of breath / dyspnea    Moderate persistent asthma without complication       CLARITIN 10 MG capsule   Generic drug:  loratadine      as needed        fexofenadine-pseudoePHEDrine  MG per 12 hr tablet    ALLEGRA-D    90 tablet    Take 1 tablet by mouth 2 times daily    Non-seasonal allergic rhinitis due to pollen       Fish Oil 500 MG Caps      Take 1 capful by mouth daily.        ipratropium 0.06 % spray    ATROVENT    60 mL    USE 2 SPRAYS IN EACH NOSTRIL FOUR TIMES DAILY    Other chronic rhinitis       levothyroxine 100 MCG tablet    SYNTHROID/LEVOTHROID    90 tablet    Take 1 tablet (100 mcg) by mouth daily    Subclinical hypothyroidism       MUCINEX 600 MG 12 hr tablet   Generic drug:  guaiFENesin      Take 1,200 mg by mouth as needed.        MULTI-VITAMIN PO      Take 1 tablet by mouth daily.         RANITIDINE ACID REDUCER 75 MG tablet   Generic drug:  ranitidine      once daily        sertraline 100 MG tablet    ZOLOFT    90 tablet    Take 1 tablet (100 mg) by mouth daily (Needs follow-up appointment for this medication)    Panic disorder       SUDAFED 12 HOUR PO      Take 1 tablet by mouth as needed.

## 2018-10-03 ASSESSMENT — PATIENT HEALTH QUESTIONNAIRE - PHQ9: SUM OF ALL RESPONSES TO PHQ QUESTIONS 1-9: 0

## 2018-10-03 ASSESSMENT — ASTHMA QUESTIONNAIRES: ACT_TOTALSCORE: 24

## 2018-11-03 DIAGNOSIS — J31.0 CHRONIC RHINITIS: Primary | ICD-10-CM

## 2018-11-05 RX ORDER — IPRATROPIUM BROMIDE 42 UG/1
SPRAY, METERED NASAL
Qty: 60 ML | Refills: 0 | Status: SHIPPED | OUTPATIENT
Start: 2018-11-05 | End: 2018-12-08

## 2018-11-05 NOTE — TELEPHONE ENCOUNTER
Requested Prescriptions   Pending Prescriptions Disp Refills     ipratropium (ATROVENT) 0.06 % spray [Pharmacy Med Name: IPRATROPIUM 0.06% RAFA SP 15ML (083)] 60 mL 0     Sig: USE 2 SPRAYS IN EACH NOSTRIL FOUR TIMES DAILY    There is no refill protocol information for this order      Last Written Prescription Date:  10-2-18  Last Fill Quantity: 60,  # refills: 0   Last office visit: 10/2/2018 with prescribing provider:  10-2-18   Future Office Visit:

## 2018-11-05 NOTE — TELEPHONE ENCOUNTER
Routing refill request to provider for review/approval because:  Drug not on the G refill protocol     Requested Prescriptions   Pending Prescriptions Disp Refills     ipratropium (ATROVENT) 0.06 % spray [Pharmacy Med Name: IPRATROPIUM 0.06% RAFA SP 15ML (165)] 60 mL 0     Sig: USE 2 SPRAYS IN EACH NOSTRIL FOUR TIMES DAILY    There is no refill protocol information for this order        An Reyes RN, BSN, PHN

## 2018-12-08 DIAGNOSIS — F41.0 PANIC DISORDER: ICD-10-CM

## 2018-12-08 DIAGNOSIS — J31.0 CHRONIC RHINITIS: ICD-10-CM

## 2018-12-08 DIAGNOSIS — E03.8 SUBCLINICAL HYPOTHYROIDISM: ICD-10-CM

## 2018-12-10 RX ORDER — IPRATROPIUM BROMIDE 42 UG/1
SPRAY, METERED NASAL
Qty: 60 ML | Refills: 1 | Status: SHIPPED | OUTPATIENT
Start: 2018-12-10

## 2018-12-10 RX ORDER — LEVOTHYROXINE SODIUM 100 UG/1
TABLET ORAL
Qty: 90 TABLET | Refills: 1 | Status: SHIPPED | OUTPATIENT
Start: 2018-12-10 | End: 2019-05-23

## 2018-12-10 NOTE — TELEPHONE ENCOUNTER
"Requested Prescriptions   Pending Prescriptions Disp Refills     ipratropium (ATROVENT) 0.06 % nasal spray [Pharmacy Med Name: IPRATROPIUM 0.06% RAFA SP 15ML (165)] 60 mL 0    Last Written Prescription Date:  11-6-18  Last Fill Quantity: 60,  # refills: 0   Last office visit: 10/2/2018 with prescribing provider:  10-2-18   Future Office Visit:   Sig: USE TWO SPRAYS IN EACH NOSTRIL FOUR TIMES DAILY    There is no refill protocol information for this order        levothyroxine (SYNTHROID/LEVOTHROID) 100 MCG tablet [Pharmacy Med Name: LEVOTHYROXINE 0.100MG (100MCG) TAB] 90 tablet 0    Last Written Prescription Date:  1-11-18  Last Fill Quantity: 90,  # refills: 0   Last office visit: 10/2/2018 with prescribing provider:  10-2-18   Future Office Visit:   Sig: TAKE 1 TABLET BY MOUTH DAILY    Thyroid Protocol Passed - 12/8/2018  8:11 AM       Passed - Patient is 12 years or older       Passed - Recent (12 mo) or future (30 days) visit within the authorizing provider's specialty    Patient had office visit in the last 12 months or has a visit in the next 30 days with authorizing provider or within the authorizing provider's specialty.  See \"Patient Info\" tab in inbasket, or \"Choose Columns\" in Meds & Orders section of the refill encounter.             Passed - Normal TSH on file in past 12 months    Recent Labs   Lab Test 09/24/18  1555   TSH 1.45              sertraline (ZOLOFT) 100 MG tablet [Pharmacy Med Name: SERTRALINE 100MG TABLETS] 90 tablet 0    Last Written Prescription Date:  6-25-18  Last Fill Quantity: 90,  # refills: 0   Last office visit: 10/2/2018 with prescribing provider:  10-2-18   Future Office Visit:   Sig: TAKE 1 TABLET BY MOUTH DAILY. FOLLOW UP APPOINTMENT NEEDED FOR THIS MEDICATION    SSRIs Protocol Passed - 12/8/2018  8:11 AM       Passed - Recent (12 mo) or future (30 days) visit within the authorizing provider's specialty    Patient had office visit in the last 12 months or has a visit in the next " "30 days with authorizing provider or within the authorizing provider's specialty.  See \"Patient Info\" tab in inbasket, or \"Choose Columns\" in Meds & Orders section of the refill encounter.             Passed - Patient is age 18 or older        "

## 2018-12-11 ENCOUNTER — TELEPHONE (OUTPATIENT)
Dept: FAMILY MEDICINE | Facility: CLINIC | Age: 48
End: 2018-12-11

## 2018-12-11 RX ORDER — SERTRALINE HYDROCHLORIDE 100 MG/1
100 TABLET, FILM COATED ORAL DAILY
Qty: 90 TABLET | Refills: 1 | Status: SHIPPED | OUTPATIENT
Start: 2018-12-11 | End: 2019-06-05

## 2018-12-11 NOTE — TELEPHONE ENCOUNTER
Prior Authorization Retail Medication Request    Medication/Dose: sertraline (ZOLOFT) 100 MG tablet  ICD code (if different than what is on RX):  Panic disorder [F41.0]     Previously Tried and Failed:    Rationale:      Insurance Name:  [facility]  Insurance ID:  [id number]       Pharmacy Information (if different than what is on RX)  Name:  [facility]  Phone: [phone]

## 2018-12-11 NOTE — TELEPHONE ENCOUNTER
Prescription approved per [facility] Refill Protocol..    Routing refill request to provider for review/approval because:  Patient needs prior auth for Sertraline.    Last OV with Homar: 10/2/18    Shae Renee, RN, BSN

## 2018-12-12 NOTE — TELEPHONE ENCOUNTER
Prior Authorization Not Needed per Insurance    Medication: sertraline (ZOLOFT) 100 MG tablet-PA NOT NEEDED  Insurance Company: [employer] - Phone [phone] Fax [phone]  Expected CoPay:      Ph[facility]illing the Rx[facility] [place] - [address] AT [facility]  Pharmacy Notified: Yes  Patient Notified: No

## 2018-12-12 NOTE — TELEPHONE ENCOUNTER
Central Prior Authorization Team   Phone: [phone]      PA Initiation    Medication: sertraline (ZOLOFT) 100 MG tablet-Initiated  Insurance Company: [employer] - Phone [phone] Fax [phone]  Pharmacy Filling the Rx: [facility] [place] - [address] AT [facility]  Filling Pharmacy Phone: [phone]  Filling Pharmacy Fax:    Start Date: 12/12/2018

## 2019-01-05 DIAGNOSIS — E66.811 CLASS 1 OBESITY WITHOUT SERIOUS COMORBIDITY WITH BODY MASS INDEX (BMI) OF 32.0 TO 32.9 IN ADULT, UNSPECIFIED OBESITY TYPE: Primary | Chronic | ICD-10-CM

## 2019-01-07 NOTE — TELEPHONE ENCOUNTER
Requested Prescriptions   Pending Prescriptions Disp Refills     phentermine (ADIPEX-P) 37.5 MG capsule [Pharmacy Med Name: PHENTERMINE 37.5MG CAPSULES] 90 capsule 0     Sig: TAKE ONE CAPSULE BY MOUTH EVERY MORNING    There is no refill protocol information for this order      Last Written Prescription Date:  1-5-18  Last Fill Quantity: 90,  # refills: 0   Last office visit: 10/2/2018 with prescribing provider:  10-2-18   Future Office Visit:

## 2019-01-08 RX ORDER — PHENTERMINE HYDROCHLORIDE 37.5 MG/1
CAPSULE ORAL
Qty: 90 CAPSULE | Refills: 0 | Status: SHIPPED | OUTPATIENT
Start: 2019-01-08

## 2019-01-09 ENCOUNTER — TELEPHONE (OUTPATIENT)
Dept: FAMILY MEDICINE | Facility: CLINIC | Age: 49
End: 2019-01-09

## 2019-01-09 NOTE — TELEPHONE ENCOUNTER
Prior Authorization Retail Medication Request    Medication/Dose: phentermine (ADIPEX-P) 37.5 MG capsule  ICD code (if different than what is on RX):  Class 1 obesity without serious comorbidity with body mass index (BMI) of 32.0 to 32.9 in adult, unspecified obesity type [E66.9, Z68.32]      Previously Tried and Failed:    Rationale:      Insurance Name:  [facility]  Insurance ID:  [id number]      Pharmacy Information (if different than what is on RX)  Name:  [facility]  Phone:  [phone]

## 2019-01-14 NOTE — TELEPHONE ENCOUNTER
Central Prior Authorization Team   Phone: [phone]      PA Initiation    Medication: phentermine (ADIPEX-P) 37.5 MG capsule-Initiated  Insurance Company: [employer] - Phone [phone] Fax [phone]  Pharmacy Filling the Rx: [facility] - [place], [place] - [address] AT [facility]  Filling Pharmacy Phone: [phone]  Filling Pharmacy Fax:    Start Date: 1/14/2019

## 2019-01-15 NOTE — TELEPHONE ENCOUNTER
Prior Authorization Approval    Authorization Effective Date: 1/8/2019  Authorization Expiration Date: 1/8/2020  Medication: phentermine (ADIPEX-P) 37.5 MG capsule-APPROVED  Approved Dose/Quantity:   Reference #:     Insurance Company: [employer] - Phone [phone] Fax [phone]  Expected CoPay:       CoPay Card Available:      Foundation Assistance Needed:    Which Pharmacy is filling the prescription (Not needed for infusion/clinic administered): [facility] - [place], [place] - [address] [facility] [address] AT [facility]  Pharmacy Notified: No  Patient Notified: Yes    Called and left a message for the patient that his PA has been approved and he can call his Mail order pharmacy to have them send it out.

## 2019-04-04 ENCOUNTER — OFFICE VISIT (OUTPATIENT)
Dept: FAMILY MEDICINE | Facility: CLINIC | Age: 49
End: 2019-04-04
Payer: COMMERCIAL

## 2019-04-04 VITALS
OXYGEN SATURATION: 98 % | RESPIRATION RATE: 16 BRPM | WEIGHT: 272 LBS | DIASTOLIC BLOOD PRESSURE: 84 MMHG | SYSTOLIC BLOOD PRESSURE: 135 MMHG | BODY MASS INDEX: 34.91 KG/M2 | HEART RATE: 87 BPM | TEMPERATURE: 98 F | HEIGHT: 74 IN

## 2019-04-04 DIAGNOSIS — R53.83 FATIGUE, UNSPECIFIED TYPE: ICD-10-CM

## 2019-04-04 DIAGNOSIS — E55.9 VITAMIN D DEFICIENCY: ICD-10-CM

## 2019-04-04 DIAGNOSIS — R73.03 PREDIABETES: Primary | ICD-10-CM

## 2019-04-04 DIAGNOSIS — E03.4 HYPOTHYROIDISM DUE TO ACQUIRED ATROPHY OF THYROID: ICD-10-CM

## 2019-04-04 LAB
ALBUMIN SERPL-MCNC: 4.2 G/DL (ref 3.4–5)
ALP SERPL-CCNC: 89 U/L (ref 40–150)
ALT SERPL W P-5'-P-CCNC: 41 U/L (ref 0–70)
ANION GAP SERPL CALCULATED.3IONS-SCNC: 5 MMOL/L (ref 3–14)
AST SERPL W P-5'-P-CCNC: 26 U/L (ref 0–45)
BASOPHILS # BLD AUTO: 0 10E9/L (ref 0–0.2)
BASOPHILS NFR BLD AUTO: 0.5 %
BILIRUB SERPL-MCNC: 0.7 MG/DL (ref 0.2–1.3)
BUN SERPL-MCNC: 15 MG/DL (ref 7–30)
CALCIUM SERPL-MCNC: 9.4 MG/DL (ref 8.5–10.1)
CHLORIDE SERPL-SCNC: 106 MMOL/L (ref 94–109)
CO2 SERPL-SCNC: 29 MMOL/L (ref 20–32)
CREAT SERPL-MCNC: 1.47 MG/DL (ref 0.66–1.25)
DIFFERENTIAL METHOD BLD: NORMAL
EOSINOPHIL # BLD AUTO: 0.3 10E9/L (ref 0–0.7)
EOSINOPHIL NFR BLD AUTO: 3.9 %
ERYTHROCYTE [DISTWIDTH] IN BLOOD BY AUTOMATED COUNT: 13.8 % (ref 10–15)
GFR SERPL CREATININE-BSD FRML MDRD: 55 ML/MIN/{1.73_M2}
GLUCOSE SERPL-MCNC: 79 MG/DL (ref 70–99)
HBA1C MFR BLD: 5.5 % (ref 0–5.6)
HCT VFR BLD AUTO: 50.5 % (ref 40–53)
HGB BLD-MCNC: 17.4 G/DL (ref 13.3–17.7)
LYMPHOCYTES # BLD AUTO: 2.4 10E9/L (ref 0.8–5.3)
LYMPHOCYTES NFR BLD AUTO: 30.8 %
MCH RBC QN AUTO: 32 PG (ref 26.5–33)
MCHC RBC AUTO-ENTMCNC: 34.5 G/DL (ref 31.5–36.5)
MCV RBC AUTO: 93 FL (ref 78–100)
MONOCYTES # BLD AUTO: 0.9 10E9/L (ref 0–1.3)
MONOCYTES NFR BLD AUTO: 11.3 %
NEUTROPHILS # BLD AUTO: 4.2 10E9/L (ref 1.6–8.3)
NEUTROPHILS NFR BLD AUTO: 53.5 %
PLATELET # BLD AUTO: 280 10E9/L (ref 150–450)
POTASSIUM SERPL-SCNC: 4.2 MMOL/L (ref 3.4–5.3)
PROT SERPL-MCNC: 7.5 G/DL (ref 6.8–8.8)
RBC # BLD AUTO: 5.44 10E12/L (ref 4.4–5.9)
SODIUM SERPL-SCNC: 140 MMOL/L (ref 133–144)
TSH SERPL DL<=0.005 MIU/L-ACNC: 2 MU/L (ref 0.4–4)
WBC # BLD AUTO: 7.8 10E9/L (ref 4–11)

## 2019-04-04 PROCEDURE — 84443 ASSAY THYROID STIM HORMONE: CPT | Performed by: PHYSICIAN ASSISTANT

## 2019-04-04 PROCEDURE — 99214 OFFICE O/P EST MOD 30 MIN: CPT | Performed by: PHYSICIAN ASSISTANT

## 2019-04-04 PROCEDURE — 82306 VITAMIN D 25 HYDROXY: CPT | Performed by: PHYSICIAN ASSISTANT

## 2019-04-04 PROCEDURE — 85025 COMPLETE CBC W/AUTO DIFF WBC: CPT | Performed by: PHYSICIAN ASSISTANT

## 2019-04-04 PROCEDURE — 80053 COMPREHEN METABOLIC PANEL: CPT | Performed by: PHYSICIAN ASSISTANT

## 2019-04-04 PROCEDURE — 36415 COLL VENOUS BLD VENIPUNCTURE: CPT | Performed by: PHYSICIAN ASSISTANT

## 2019-04-04 PROCEDURE — 83036 HEMOGLOBIN GLYCOSYLATED A1C: CPT | Performed by: PHYSICIAN ASSISTANT

## 2019-04-04 ASSESSMENT — ANXIETY QUESTIONNAIRES
1. FEELING NERVOUS, ANXIOUS, OR ON EDGE: NOT AT ALL
6. BECOMING EASILY ANNOYED OR IRRITABLE: NOT AT ALL
2. NOT BEING ABLE TO STOP OR CONTROL WORRYING: NOT AT ALL
GAD7 TOTAL SCORE: 0
3. WORRYING TOO MUCH ABOUT DIFFERENT THINGS: NOT AT ALL
7. FEELING AFRAID AS IF SOMETHING AWFUL MIGHT HAPPEN: NOT AT ALL
5. BEING SO RESTLESS THAT IT IS HARD TO SIT STILL: NOT AT ALL
IF YOU CHECKED OFF ANY PROBLEMS ON THIS QUESTIONNAIRE, HOW DIFFICULT HAVE THESE PROBLEMS MADE IT FOR YOU TO DO YOUR WORK, TAKE CARE OF THINGS AT HOME, OR GET ALONG WITH OTHER PEOPLE: NOT DIFFICULT AT ALL

## 2019-04-04 ASSESSMENT — MIFFLIN-ST. JEOR: SCORE: 2168.53

## 2019-04-04 ASSESSMENT — PATIENT HEALTH QUESTIONNAIRE - PHQ9
5. POOR APPETITE OR OVEREATING: NOT AT ALL
SUM OF ALL RESPONSES TO PHQ QUESTIONS 1-9: 7

## 2019-04-04 NOTE — PROGRESS NOTES
SUBJECTIVE:   Alphonso Wilburn is a 49 year old male who presents to clinic today for the following health issues:      Hypothyroidism Follow-up      Since last visit, patient describes the following symptoms: weight gain of 20 lbs and fatigue      Amount of exercise or physical activity: None    Problems taking medications regularly: No    Medication side effects: none    Diet: regular (no restrictions)      Mid day notes a lot of fatigue. Occasional naps, but they don't see to be refreshing. Had some cold symptoms as of late. Some weight gain.   History of vikas (using mouth splint). Sleeps 8-9 hrs per day. No snoring. Most days still not  feeling refreshed . No ha's /chest pain/sob/palps. No constipation or diarrhea. Rare positional dizziness. No palpitations. No rashes or joint pain or swelling. Denies depression . Some apathy. Some anhedonia. No anxiety.     Problem list and histories reviewed & adjusted, as indicated.  Additional history: as documented    BP Readings from Last 3 Encounters:   04/04/19 135/84   10/02/18 126/87   08/14/18 130/80    Wt Readings from Last 3 Encounters:   04/04/19 123.4 kg (272 lb)   10/02/18 114.3 kg (252 lb)   08/14/18 113.6 kg (250 lb 6 oz)                    Reviewed and updated as needed this visit by clinical staff  Tobacco  Allergies       Reviewed and updated as needed this visit by Provider         All other systems negative except as outline above  OBJECTIVE:  Eye exam - right eye normal lid, conjunctiva, cornea, pupil and fundus, left eye normal lid, conjunctiva, cornea, pupil and fundus.  ENT exam reveals - ENT exam normal, no neck nodes or sinus tenderness.  Thyroid not palpable, not enlarged, no nodules detected.  CHEST:chest clear to IPPA, no tachypnea, retractions or cyanosis and S1, S2 normal, no murmur, no gallop, rate regular.  No edema  The abdomen is soft without tenderness, guarding, mass, rebound or organomegaly. Bowel sounds are normal. No CVA tenderness or  inguinal adenopathy noted.    Alphonso was seen today for fatigue.    Diagnoses and all orders for this visit:    Prediabetes  -     Hemoglobin A1c  -     Comprehensive metabolic panel    Hypothyroidism due to acquired atrophy of thyroid  -     TSH    Fatigue, unspecified type  -     CBC with platelets and differential  -     Comprehensive metabolic panel    Vitamin D deficiency  -     Vitamin D Deficiency      work on lifestyle modification

## 2019-04-05 LAB — DEPRECATED CALCIDIOL+CALCIFEROL SERPL-MC: 44 UG/L (ref 20–75)

## 2019-04-05 ASSESSMENT — ASTHMA QUESTIONNAIRES: ACT_TOTALSCORE: 23

## 2019-04-05 ASSESSMENT — ANXIETY QUESTIONNAIRES: GAD7 TOTAL SCORE: 0

## 2019-04-12 ENCOUNTER — MYC MEDICAL ADVICE (OUTPATIENT)
Dept: FAMILY MEDICINE | Facility: CLINIC | Age: 49
End: 2019-04-12

## 2019-04-15 NOTE — TELEPHONE ENCOUNTER
Let alisha know that his creatinine level, a kidney function test came back a little elevated (this cane be influenced by mild dehydration, recent exercise, medications). As such, I;d like this rechecked via a lab only visit. The rest of his lab work came back nice and normal/stable.

## 2019-05-23 DIAGNOSIS — E03.8 SUBCLINICAL HYPOTHYROIDISM: ICD-10-CM

## 2019-05-23 NOTE — TELEPHONE ENCOUNTER
"Requested Prescriptions   Pending Prescriptions Disp Refills     levothyroxine (SYNTHROID/LEVOTHROID) 100 MCG tablet [Pharmacy Med Name: LEVOTHYROXINE 0.100MG (100MCG) TAB] 90 tablet 0     Sig: TAKE 1 TABLET BY MOUTH DAILY   Last Written Prescription Date:  12-11-18  Last Fill Quantity: 90,  # refills: 0   Last office visit: 4/4/2019 with prescribing provider:  4-4-19   Future Office Visit:      Thyroid Protocol Passed - 5/23/2019  8:11 AM        Passed - Patient is 12 years or older        Passed - Recent (12 mo) or future (30 days) visit within the authorizing provider's specialty     Patient had office visit in the last 12 months or has a visit in the next 30 days with authorizing provider or within the authorizing provider's specialty.  See \"Patient Info\" tab in inbasket, or \"Choose Columns\" in Meds & Orders section of the refill encounter.              Passed - Medication is active on med list        Passed - Normal TSH on file in past 12 months     Recent Labs   Lab Test 04/04/19  1526   TSH 2.00              "

## 2019-05-24 RX ORDER — LEVOTHYROXINE SODIUM 100 UG/1
TABLET ORAL
Qty: 90 TABLET | Refills: 0 | Status: SHIPPED | OUTPATIENT
Start: 2019-05-24

## 2019-05-24 NOTE — TELEPHONE ENCOUNTER
Prescription approved per [facility] Refill Protocol.  TSH   Date Value Ref Range Status   04/04/2019 2.00 0.40 - 4.00 mU/L Final     An Reyes RN, BSN, PHN

## 2019-06-03 DIAGNOSIS — F41.0 PANIC DISORDER: ICD-10-CM

## 2019-06-03 NOTE — TELEPHONE ENCOUNTER
Requested Prescriptions   Pending Prescriptions Disp Refills     sertraline (ZOLOFT) 100 MG tablet 90 tablet 1     Sig: Take 1 tablet (100 mg) by mouth daily       There is no refill protocol information for this order      Last Written Prescription Date:  ?  Last Fill Quantity: ?,  # refills: 1   Last office visit: 4/4/2019 with prescribing provider:  4-4-19   Future Office Visit:

## 2019-06-04 NOTE — TELEPHONE ENCOUNTER
Routing refill request to provider for review/approval because:  PA needed.     An Reyes, RN, BSN, PHN

## 2019-06-05 RX ORDER — SERTRALINE HYDROCHLORIDE 100 MG/1
100 TABLET, FILM COATED ORAL DAILY
Qty: 90 TABLET | Refills: 1 | Status: SHIPPED | OUTPATIENT
Start: 2019-06-05

## 2019-06-20 DIAGNOSIS — R53.83 FATIGUE, UNSPECIFIED TYPE: ICD-10-CM

## 2019-06-20 LAB
ANION GAP SERPL CALCULATED.3IONS-SCNC: 8 MMOL/L (ref 3–14)
BUN SERPL-MCNC: 11 MG/DL (ref 7–30)
CALCIUM SERPL-MCNC: 9.3 MG/DL (ref 8.5–10.1)
CHLORIDE SERPL-SCNC: 104 MMOL/L (ref 94–109)
CO2 SERPL-SCNC: 25 MMOL/L (ref 20–32)
CREAT SERPL-MCNC: 1.1 MG/DL (ref 0.66–1.25)
GFR SERPL CREATININE-BSD FRML MDRD: 78 ML/MIN/{1.73_M2}
GLUCOSE SERPL-MCNC: 82 MG/DL (ref 70–99)
POTASSIUM SERPL-SCNC: 4.3 MMOL/L (ref 3.4–5.3)
SODIUM SERPL-SCNC: 137 MMOL/L (ref 133–144)

## 2019-06-20 PROCEDURE — 36415 COLL VENOUS BLD VENIPUNCTURE: CPT | Performed by: PHYSICIAN ASSISTANT

## 2019-06-20 PROCEDURE — 80048 BASIC METABOLIC PNL TOTAL CA: CPT | Performed by: PHYSICIAN ASSISTANT

## 2019-08-13 DIAGNOSIS — E03.8 SUBCLINICAL HYPOTHYROIDISM: ICD-10-CM

## 2019-08-13 NOTE — TELEPHONE ENCOUNTER
Please call patient to determine if patient is no longer being seen at [facility]    RN noted patient was seen at [facility] on 8/1/19 to Establish care.     - Please confirm with patient.     An Reyes, RN, BSN, PHN

## 2019-08-13 NOTE — TELEPHONE ENCOUNTER
"Requested Prescriptions   Pending Prescriptions Disp Refills     levothyroxine (SYNTHROID/LEVOTHROID) 100 MCG tablet [Pharmacy Med Name: LEVOTHYROXINE 0.100MG (100MCG) TAB] 90 tablet 0     Sig: TAKE 1 TABLET BY MOUTH DAILY   Last Written Prescription Date:  8-13-19  Last Fill Quantity: 90,  # refills: 0   Last office visit: 4/4/2019 with prescribing provider:  4-4-19   Future Office Visit:      Thyroid Protocol Passed - 8/13/2019  8:17 AM        Passed - Patient is 12 years or older        Passed - Recent (12 mo) or future (30 days) visit within the authorizing provider's specialty     Patient had office visit in the last 12 months or has a visit in the next 30 days with authorizing provider or within the authorizing provider's specialty.  See \"Patient Info\" tab in inbasket, or \"Choose Columns\" in Meds & Orders section of the refill encounter.              Passed - Medication is active on med list        Passed - Normal TSH on file in past 12 months     Recent Labs   Lab Test 04/04/19  1526   TSH 2.00              "

## 2019-08-23 ENCOUNTER — MYC REFILL (OUTPATIENT)
Dept: FAMILY MEDICINE | Facility: CLINIC | Age: 49
End: 2019-08-23

## 2019-08-23 DIAGNOSIS — E03.8 SUBCLINICAL HYPOTHYROIDISM: ICD-10-CM

## 2019-08-23 RX ORDER — LEVOTHYROXINE SODIUM 100 UG/1
100 TABLET ORAL DAILY
Qty: 90 TABLET | Refills: 0 | OUTPATIENT
Start: 2019-08-23

## 2019-08-23 NOTE — TELEPHONE ENCOUNTER
Per care everywhere - patient is now established patient at [facility].   Being seen by Jesus, Suleman Allred MD    Message sent to pharmacy.     An Reyes RN, BSN, PHN

## 2019-09-19 RX ORDER — LEVOTHYROXINE SODIUM 100 UG/1
TABLET ORAL
Qty: 90 TABLET | Refills: 0 | OUTPATIENT
Start: 2019-09-19

## 2020-02-24 ENCOUNTER — HEALTH MAINTENANCE LETTER (OUTPATIENT)
Age: 50
End: 2020-02-24

## 2020-12-13 ENCOUNTER — HEALTH MAINTENANCE LETTER (OUTPATIENT)
Age: 50
End: 2020-12-13

## 2021-04-17 ENCOUNTER — HEALTH MAINTENANCE LETTER (OUTPATIENT)
Age: 51
End: 2021-04-17

## 2021-05-25 ENCOUNTER — RECORDS - HEALTHEAST (OUTPATIENT)
Dept: ADMINISTRATIVE | Facility: CLINIC | Age: 51
End: 2021-05-25

## 2021-09-26 ENCOUNTER — HEALTH MAINTENANCE LETTER (OUTPATIENT)
Age: 51
End: 2021-09-26

## 2022-01-16 ENCOUNTER — HEALTH MAINTENANCE LETTER (OUTPATIENT)
Age: 52
End: 2022-01-16

## 2022-05-08 ENCOUNTER — HEALTH MAINTENANCE LETTER (OUTPATIENT)
Age: 52
End: 2022-05-08

## 2023-04-23 ENCOUNTER — HEALTH MAINTENANCE LETTER (OUTPATIENT)
Age: 53
End: 2023-04-23